# Patient Record
Sex: MALE | Race: WHITE | Employment: FULL TIME | URBAN - METROPOLITAN AREA
[De-identification: names, ages, dates, MRNs, and addresses within clinical notes are randomized per-mention and may not be internally consistent; named-entity substitution may affect disease eponyms.]

---

## 2017-02-21 DIAGNOSIS — E78.5 HYPERLIPIDEMIA LDL GOAL <100: ICD-10-CM

## 2017-02-23 ENCOUNTER — HOSPITAL ENCOUNTER (OUTPATIENT)
Dept: LAB | Age: 67
Discharge: HOME | End: 2017-02-23
Payer: COMMERCIAL

## 2017-02-23 LAB
ALBUMIN SERPL BCP-MCNC: 4 G/DL (ref 3.4–5)
ALBUMIN/GLOB SERPL: 1.2 {RATIO} (ref 0.8–1.7)
ALP SERPL-CCNC: 71 U/L (ref 45–117)
ALT SERPL-CCNC: 40 U/L (ref 16–61)
ANION GAP BLD CALC-SCNC: 7 MMOL/L (ref 3–18)
AST SERPL W P-5'-P-CCNC: 29 U/L (ref 15–37)
BILIRUB SERPL-MCNC: 0.5 MG/DL (ref 0.2–1)
BUN SERPL-MCNC: 14 MG/DL (ref 7–18)
BUN/CREAT SERPL: 18 (ref 12–20)
CALCIUM SERPL-MCNC: 9.3 MG/DL (ref 8.5–10.1)
CHLORIDE SERPL-SCNC: 103 MMOL/L (ref 100–108)
CHOLEST SERPL-MCNC: 154 MG/DL
CO2 SERPL-SCNC: 30 MMOL/L (ref 21–32)
CREAT SERPL-MCNC: 0.8 MG/DL (ref 0.6–1.3)
GLOBULIN SER CALC-MCNC: 3.3 G/DL (ref 2–4)
GLUCOSE SERPL-MCNC: 95 MG/DL (ref 74–99)
HDLC SERPL-MCNC: 47 MG/DL (ref 40–60)
HDLC SERPL: 3.3 {RATIO} (ref 0–5)
LDLC SERPL CALC-MCNC: 91.6 MG/DL (ref 0–100)
LIPID PROFILE,FLP: NORMAL
POTASSIUM SERPL-SCNC: 4.3 MMOL/L (ref 3.5–5.5)
PROT SERPL-MCNC: 7.3 G/DL (ref 6.4–8.2)
SODIUM SERPL-SCNC: 140 MMOL/L (ref 136–145)
TRIGL SERPL-MCNC: 77 MG/DL (ref ?–150)
VLDLC SERPL CALC-MCNC: 15.4 MG/DL

## 2017-02-23 PROCEDURE — 80053 COMPREHEN METABOLIC PANEL: CPT | Performed by: INTERNAL MEDICINE

## 2017-02-23 PROCEDURE — 36415 COLL VENOUS BLD VENIPUNCTURE: CPT | Performed by: INTERNAL MEDICINE

## 2017-02-23 PROCEDURE — 80061 LIPID PANEL: CPT | Performed by: INTERNAL MEDICINE

## 2017-02-28 ENCOUNTER — OFFICE VISIT (OUTPATIENT)
Dept: INTERNAL MEDICINE CLINIC | Age: 67
End: 2017-02-28

## 2017-02-28 VITALS
SYSTOLIC BLOOD PRESSURE: 132 MMHG | RESPIRATION RATE: 12 BRPM | DIASTOLIC BLOOD PRESSURE: 74 MMHG | BODY MASS INDEX: 26.26 KG/M2 | HEIGHT: 71 IN | HEART RATE: 81 BPM | OXYGEN SATURATION: 97 % | TEMPERATURE: 98.1 F | WEIGHT: 187.6 LBS

## 2017-02-28 DIAGNOSIS — K21.9 GASTROESOPHAGEAL REFLUX DISEASE WITHOUT ESOPHAGITIS: ICD-10-CM

## 2017-02-28 DIAGNOSIS — Z12.5 PROSTATE CANCER SCREENING: ICD-10-CM

## 2017-02-28 DIAGNOSIS — E78.5 HYPERLIPIDEMIA LDL GOAL <100: Primary | ICD-10-CM

## 2017-02-28 DIAGNOSIS — J30.1 SEASONAL ALLERGIC RHINITIS DUE TO POLLEN: ICD-10-CM

## 2017-02-28 DIAGNOSIS — Z00.00 ROUTINE GENERAL MEDICAL EXAMINATION AT A HEALTH CARE FACILITY: ICD-10-CM

## 2017-02-28 NOTE — PROGRESS NOTES
Lucretia Nunez,born 1950, is a 79 y.o. male, who is seen today for reevaluation of hyperlipidemia GERD allergic rhinitis. He feels well in general and has been out playing basketball with his grandchildren that we did sustain a bruise under his right eye from that. He does note occasionally some slight wheezing and has made an appointment to see a specialist about that. He is concerned because he used to be a heavy smoker though he quit many years ago. He takes his medicine regularly. Past Medical History:   Diagnosis Date    Allergic rhinitis     GERD (gastroesophageal reflux disease)     Hypercholesterolemia     Hyperlipidemia     Sarcoidosis (HCC)      Current Outpatient Prescriptions   Medication Sig Dispense Refill    HYDROcodone-acetaminophen (NORCO) 5-325 mg per tablet Take 1 Tab by mouth every four (4) hours as needed for Pain. 60 Tab 0    pantoprazole (PROTONIX) 40 mg tablet Take 1 Tab by mouth daily. 90 Tab 3    atorvastatin (LIPITOR) 40 mg tablet Take one tablet by mouth each evening 90 Tab 3    fluticasone (FLONASE) 50 mcg/actuation nasal spray 2 Sprays by Both Nostrils route daily. 3 Bottle 3    FOLIC ACID/MULTIVIT-MIN/LUTEIN (CENTRUM SILVER PO) Take  by mouth.  PHENYLEPHRINE HCL/ACETAMINOPHN (VICKS DAYQUIL SINEX PO) Take  by mouth.  ASPIRIN PO Take 81 mg by mouth daily. Visit Vitals    /74    Pulse 81    Temp 98.1 °F (36.7 °C) (Oral)    Resp 12    Ht 5' 11\" (1.803 m)    Wt 187 lb 9.6 oz (85.1 kg)    SpO2 97%    BMI 26.16 kg/m2     Carotids are 2+ without bruits. Lungs are clear to percussion. Good breath sounds with no wheezing or crackles. Heart reveals a regular rhythm with normal S1 and S2 no murmur gallop click or rub. Apical impulse is not palpable. Abdomen is soft and nontender with no hepatosplenomegaly or masses and no bruits. Extremities reveal no clubbing cyanosis or edema. Pulses are 2+.     Results for orders placed or performed during the hospital encounter of 02/23/17   LIPID PANEL   Result Value Ref Range    LIPID PROFILE          Cholesterol, total 154 <200 MG/DL    Triglyceride 77 <150 MG/DL    HDL Cholesterol 47 40 - 60 MG/DL    LDL, calculated 91.6 0 - 100 MG/DL    VLDL, calculated 15.4 MG/DL    CHOL/HDL Ratio 3.3 0 - 5.0     METABOLIC PANEL, COMPREHENSIVE   Result Value Ref Range    Sodium 140 136 - 145 mmol/L    Potassium 4.3 3.5 - 5.5 mmol/L    Chloride 103 100 - 108 mmol/L    CO2 30 21 - 32 mmol/L    Anion gap 7 3.0 - 18 mmol/L    Glucose 95 74 - 99 mg/dL    BUN 14 7.0 - 18 MG/DL    Creatinine 0.80 0.6 - 1.3 MG/DL    BUN/Creatinine ratio 18 12 - 20      GFR est AA >60 >60 ml/min/1.73m2    GFR est non-AA >60 >60 ml/min/1.73m2    Calcium 9.3 8.5 - 10.1 MG/DL    Bilirubin, total 0.5 0.2 - 1.0 MG/DL    ALT (SGPT) 40 16 - 61 U/L    AST (SGOT) 29 15 - 37 U/L    Alk. phosphatase 71 45 - 117 U/L    Protein, total 7.3 6.4 - 8.2 g/dL    Albumin 4.0 3.4 - 5.0 g/dL    Globulin 3.3 2.0 - 4.0 g/dL    A-G Ratio 1.2 0.8 - 1.7       Assessment: #1. Hyperlipidemia not quite as well controlled. He will continue atorvastatin 40 mg each evening and low-fat diet. #2.  GERD asymptomatic. He will continue pantoprazole 40 mg daily. #3.  Allergic rhinitis overall doing well, he will continue Flonase. #4.  Some recent wheezing, I recommended that he try coughing hard when this occurs and see if the wheezing stops in which case it likely is due to secretions and if it does not, could be underlying bronchospasm or much less likely any type of lesion in the airway. He will be seen his specialist in the near future. Follow-up 6 months for physical    Adelita Aguillon. Rosina Lyons MD FACP    Please note: This document has been produced using voice recognition software. Unrecognized errors in transcription may be present.

## 2017-02-28 NOTE — MR AVS SNAPSHOT
Visit Information Date & Time Provider Department Dept. Phone Encounter #  
 2/28/2017  8:30 AM Sandria Curling, MD Internist of [facility] [id number] Follow-up Instructions Follow-up and Disposition History Your Appointments 8/30/2017  8:25 AM  
LAB with [facility] NURSE VISIT Internist of [facility] Appt Note: pe lab fasting [address], [address] [place]  [address] [place]  
  
   
 [address], [address]  
  
    
 9/6/2017  9:15 AM  
PHYSICAL with Sandria Curling, MD  
Internist of [facility]) Appt Note: physical  
 [address] [facility], [address] [place]  [address] [place]  
  
   
 [address], [address] Upcoming Health Maintenance Date Due  
 MEDICARE YEARLY EXAM 2/28/2015 GLAUCOMA SCREENING Q2Y 8/20/2017 COLONOSCOPY 6/11/2022 DTaP/Tdap/Td series (2 - Td) 8/20/2025 Allergies as of 2/28/2017  Review Complete On: 2/28/2017 By: Sandria Curling, MD  
  
 Severity Noted Reaction Type Reactions Augmentin [Amoxicillin-pot Clavulanate]    Rash Medrol [Methylprednisolone]    Unknown (comments) nervous Sulfa (Sulfonamide Antibiotics)  01/19/2009    Unknown (comments) Current Immunizations  Reviewed on 8/29/2016 Name Date Influenza High Dose Vaccine PF 8/29/2016  9:48 AM  
 Influenza Vaccine (Quad) PF 2/19/2016  9:27 AM  
 Influenza Vaccine PF 10/29/2013 Influenza Vaccine Whole 11/1/2006 Pneumococcal Conjugate (PCV-13) 8/29/2016  9:46 AM  
 Pneumococcal Polysaccharide (PPSV-23) 2/19/2016  9:28 AM  
 TD Vaccine 1/25/1999 Tdap 8/20/2015 Zoster Vaccine, Live 8/20/2015 Not reviewed this visit You Were Diagnosed With   
  
 Codes Comments Hyperlipidemia LDL goal <100    -  Primary ICD-10-CM: E78.5 ICD-9-CM: 272.4 Seasonal allergic rhinitis due to pollen     ICD-10-CM: J30.1 ICD-9-CM: 477.0 Gastroesophageal reflux disease without esophagitis     ICD-10-CM: K21.9 ICD-9-CM: 530.81 Prostate cancer screening     ICD-10-CM: Z12.5 ICD-9-CM: V76.44 Routine general medical examination at a health care facility     ICD-10-CM: Z00.00 ICD-9-CM: V70.0 Vitals BP  
  
  
  
  
  
 132/74 Vitals History BMI and BSA Data Body Mass Index Body Surface Area  
 26.16 kg/m 2 2.06 m 2 Preferred Pharmacy Pharmacy Name Phone [address] [phone] Your Updated Medication List  
  
   
This list is accurate as of: 2/28/17  8:43 AM.  Always use your most recent med list.  
  
  
  
  
 ASPIRIN PO Take 81 mg by mouth daily. atorvastatin 40 mg tablet Commonly known as:  LIPITOR Take one tablet by mouth each evening CENTRUM SILVER PO Take  by mouth. fluticasone 50 mcg/actuation nasal spray Commonly known as:  Montine Franklin 2 Sprays by Both Nostrils route daily. HYDROcodone-acetaminophen 5-325 mg per tablet Commonly known as:  Candie Barrs Take 1 Tab by mouth every four (4) hours as needed for Pain.  
  
 pantoprazole 40 mg tablet Commonly known as:  PROTONIX Take 1 Tab by mouth daily. VICKS DAYQUIL SINEX PO Take  by mouth. To-Do List   
 Around 08/21/2017 Lab:  CBC WITH AUTOMATED DIFF Around 08/21/2017 Lab:  LIPID PANEL Around 08/21/2017 Lab:  METABOLIC PANEL, COMPREHENSIVE Around 08/21/2017 Lab:  PSA SCREENING (SCREENING) Introducing [address]! Dear Marcus Tidwell: Thank you for requesting a [employer] account. Our records indicate that you already have an active [employer] account. You can access your account anytime at [email or web address] [email or web address] Did you know that you can access your hospital and ER discharge instructions at any time in [employer]? You can also review all of your test results from your hospital stay or ER visit. Additional Information If you have questions, please visit the Frequently Asked Questions section of the Medcurrent website at [email or web address]. [email or web address]/. Remember, Medcurrent is NOT to be used for urgent needs. For medical emergencies, dial 911. Now available from your iPhone and Android! Please provide this summary of care documentation to your next provider. Your primary care clinician is listed as Candido Head. If you have any questions after today's visit, please call [phone].

## 2017-08-21 DIAGNOSIS — Z00.00 ROUTINE GENERAL MEDICAL EXAMINATION AT A HEALTH CARE FACILITY: ICD-10-CM

## 2017-08-21 DIAGNOSIS — Z12.5 PROSTATE CANCER SCREENING: ICD-10-CM

## 2017-08-21 DIAGNOSIS — E78.5 HYPERLIPIDEMIA LDL GOAL <100: ICD-10-CM

## 2017-08-30 ENCOUNTER — HOSPITAL ENCOUNTER (OUTPATIENT)
Dept: LAB | Age: 67
Discharge: HOME | End: 2017-08-30
Payer: COMMERCIAL

## 2017-08-30 LAB
ALBUMIN SERPL-MCNC: 3.9 G/DL (ref 3.4–5)
ALBUMIN/GLOB SERPL: 1.1 {RATIO} (ref 0.8–1.7)
ALP SERPL-CCNC: 76 U/L (ref 45–117)
ALT SERPL-CCNC: 39 U/L (ref 16–61)
ANION GAP SERPL CALC-SCNC: 7 MMOL/L (ref 3–18)
AST SERPL-CCNC: 25 U/L (ref 15–37)
BASOPHILS # BLD: 0 K/UL (ref 0–0.06)
BASOPHILS NFR BLD: 0 % (ref 0–2)
BILIRUB SERPL-MCNC: 0.6 MG/DL (ref 0.2–1)
BUN SERPL-MCNC: 12 MG/DL (ref 7–18)
BUN/CREAT SERPL: 15 (ref 12–20)
CALCIUM SERPL-MCNC: 9.5 MG/DL (ref 8.5–10.1)
CHLORIDE SERPL-SCNC: 104 MMOL/L (ref 100–108)
CHOLEST SERPL-MCNC: 157 MG/DL
CO2 SERPL-SCNC: 28 MMOL/L (ref 21–32)
CREAT SERPL-MCNC: 0.81 MG/DL (ref 0.6–1.3)
DIFFERENTIAL METHOD BLD: ABNORMAL
EOSINOPHIL # BLD: 0.1 K/UL (ref 0–0.4)
EOSINOPHIL NFR BLD: 3 % (ref 0–5)
ERYTHROCYTE [DISTWIDTH] IN BLOOD BY AUTOMATED COUNT: 14.7 % (ref 11.6–14.5)
GLOBULIN SER CALC-MCNC: 3.4 G/DL (ref 2–4)
GLUCOSE SERPL-MCNC: 97 MG/DL (ref 74–99)
HCT VFR BLD AUTO: 41.9 % (ref 36–48)
HDLC SERPL-MCNC: 39 MG/DL (ref 40–60)
HDLC SERPL: 4 {RATIO} (ref 0–5)
HGB BLD-MCNC: 13.9 G/DL (ref 13–16)
LDLC SERPL CALC-MCNC: 81.2 MG/DL (ref 0–100)
LIPID PROFILE,FLP: ABNORMAL
LYMPHOCYTES # BLD: 1.5 K/UL (ref 0.9–3.6)
LYMPHOCYTES NFR BLD: 34 % (ref 21–52)
MCH RBC QN AUTO: 28 PG (ref 24–34)
MCHC RBC AUTO-ENTMCNC: 33.2 G/DL (ref 31–37)
MCV RBC AUTO: 84.3 FL (ref 74–97)
MONOCYTES # BLD: 0.3 K/UL (ref 0.05–1.2)
MONOCYTES NFR BLD: 8 % (ref 3–10)
NEUTS SEG # BLD: 2.3 K/UL (ref 1.8–8)
NEUTS SEG NFR BLD: 55 % (ref 40–73)
PLATELET # BLD AUTO: 219 K/UL (ref 135–420)
PMV BLD AUTO: 10.1 FL (ref 9.2–11.8)
POTASSIUM SERPL-SCNC: 4.5 MMOL/L (ref 3.5–5.5)
PROT SERPL-MCNC: 7.3 G/DL (ref 6.4–8.2)
PSA SERPL-MCNC: 0.3 NG/ML (ref 0–4)
RBC # BLD AUTO: 4.97 M/UL (ref 4.7–5.5)
SODIUM SERPL-SCNC: 139 MMOL/L (ref 136–145)
TRIGL SERPL-MCNC: 184 MG/DL (ref ?–150)
VLDLC SERPL CALC-MCNC: 36.8 MG/DL
WBC # BLD AUTO: 4.2 K/UL (ref 4.6–13.2)

## 2017-08-30 PROCEDURE — 80061 LIPID PANEL: CPT | Performed by: INTERNAL MEDICINE

## 2017-08-30 PROCEDURE — 84153 ASSAY OF PSA TOTAL: CPT | Performed by: INTERNAL MEDICINE

## 2017-08-30 PROCEDURE — 80053 COMPREHEN METABOLIC PANEL: CPT | Performed by: INTERNAL MEDICINE

## 2017-08-30 PROCEDURE — 85025 COMPLETE CBC W/AUTO DIFF WBC: CPT | Performed by: INTERNAL MEDICINE

## 2017-08-30 PROCEDURE — 36415 COLL VENOUS BLD VENIPUNCTURE: CPT | Performed by: INTERNAL MEDICINE

## 2017-09-06 ENCOUNTER — OFFICE VISIT (OUTPATIENT)
Dept: INTERNAL MEDICINE CLINIC | Age: 67
End: 2017-09-06

## 2017-09-06 VITALS
OXYGEN SATURATION: 98 % | RESPIRATION RATE: 12 BRPM | TEMPERATURE: 97.6 F | HEART RATE: 72 BPM | WEIGHT: 187.6 LBS | HEIGHT: 71 IN | DIASTOLIC BLOOD PRESSURE: 84 MMHG | BODY MASS INDEX: 26.26 KG/M2 | SYSTOLIC BLOOD PRESSURE: 142 MMHG

## 2017-09-06 DIAGNOSIS — K21.9 GASTROESOPHAGEAL REFLUX DISEASE WITHOUT ESOPHAGITIS: ICD-10-CM

## 2017-09-06 DIAGNOSIS — Z23 ENCOUNTER FOR IMMUNIZATION: ICD-10-CM

## 2017-09-06 DIAGNOSIS — Z00.00 ROUTINE GENERAL MEDICAL EXAMINATION AT A HEALTH CARE FACILITY: Primary | ICD-10-CM

## 2017-09-06 DIAGNOSIS — E78.5 HYPERLIPIDEMIA LDL GOAL <100: ICD-10-CM

## 2017-09-06 RX ORDER — FLUTICASONE PROPIONATE 50 MCG
2 SPRAY, SUSPENSION (ML) NASAL DAILY
Qty: 3 BOTTLE | Refills: 3 | Status: SHIPPED | OUTPATIENT
Start: 2017-09-06 | End: 2018-09-11 | Stop reason: SDUPTHER

## 2017-09-06 RX ORDER — ATORVASTATIN CALCIUM 40 MG/1
TABLET, FILM COATED ORAL
Qty: 90 TAB | Refills: 3 | Status: SHIPPED | OUTPATIENT
Start: 2017-09-06 | End: 2018-09-11 | Stop reason: SDUPTHER

## 2017-09-06 RX ORDER — PANTOPRAZOLE SODIUM 40 MG/1
40 TABLET, DELAYED RELEASE ORAL DAILY
Qty: 90 TAB | Refills: 3 | Status: SHIPPED | OUTPATIENT
Start: 2017-09-06 | End: 2018-09-11 | Stop reason: SDUPTHER

## 2017-09-06 RX ORDER — HYDROCODONE BITARTRATE AND ACETAMINOPHEN 5; 325 MG/1; MG/1
1 TABLET ORAL
Qty: 60 TAB | Refills: 0 | Status: SHIPPED | OUTPATIENT
Start: 2017-09-06 | End: 2018-03-08 | Stop reason: SDUPTHER

## 2017-09-06 NOTE — MR AVS SNAPSHOT
Visit Information Date & Time Provider Department Dept. Phone Encounter #  
 9/6/2017  9:15 AM Scarlett Chacon MD Internist of [address] [id number] Your Appointments 3/1/2018  9:15 AM  
LAB with [facility] NURSE VISIT Internist of [facility] ([facility]) Appt Note: lab  
 [address], [address] [id number] [address] [place]  
  
   
 [address], [address]  
  
    
 3/8/2018 10:00 AM  
Office Visit with Scarlett Chacon MD  
Internist of [address] [place] [address] Appt Note: 6 month f/u  
 [address] [facility], [address] [address] [address] [place]  
  
   
 [address], [address] Upcoming Health Maintenance Date Due  
 GLAUCOMA SCREENING Q2Y 11/1/2017* MEDICARE YEARLY EXAM 9/7/2018 COLONOSCOPY 6/11/2022 DTaP/Tdap/Td series (2 - Td) 8/20/2025 *Topic was postponed. The date shown is not the original due date. Allergies as of 9/6/2017  Review Complete On: 9/6/2017 By: Scarlett Chacon MD  
  
 Severity Noted Reaction Type Reactions Augmentin [Amoxicillin-pot Clavulanate]    Rash Medrol [Methylprednisolone]    Unknown (comments) nervous Sulfa (Sulfonamide Antibiotics)  01/19/2009    Unknown (comments) Current Immunizations  Reviewed on 9/6/2017 Name Date Influenza High Dose Vaccine PF 8/29/2016  9:48 AM  
 Influenza Vaccine (Quad) PF 2/19/2016  9:27 AM  
 Influenza Vaccine PF 10/29/2013 Influenza Vaccine Whole 11/1/2006 Pneumococcal Conjugate (PCV-13) 8/29/2016  9:46 AM  
 Pneumococcal Polysaccharide (PPSV-23) 2/19/2016  9:28 AM  
 TD Vaccine 1/25/1999 Tdap 8/20/2015 Zoster Vaccine, Live 8/20/2015 Reviewed by Scarlett Chacon MD on 9/6/2017 at  9:17 AM  
 Reviewed by Scarlett Chacon MD on 9/6/2017 at  9:33 AM  
You Were Diagnosed With   
  
 Codes Comments Routine general medical examination at a health care facility    -  Primary ICD-10-CM: Z00.00 ICD-9-CM: V70.0 Gastroesophageal reflux disease without esophagitis     ICD-10-CM: K21.9 ICD-9-CM: 530.81 Hyperlipidemia LDL goal <100     ICD-10-CM: E78.5 ICD-9-CM: 272.4 Vitals BP Pulse Temp Resp Height(growth percentile) Weight(growth percentile) 142/84 72 97.6 °F (36.4 °C) (Oral) 12 5' 11\" (1.803 m) 187 lb 9.6 oz (85.1 kg) SpO2 BMI Smoking Status 98% 26.16 kg/m2 Former Smoker Vitals History BMI and BSA Data Body Mass Index Body Surface Area  
 26.16 kg/m 2 2.06 m 2 Preferred Pharmacy Pharmacy Name Phone  [address] [phone] Your Updated Medication List  
  
   
This list is accurate as of: 9/6/17  9:34 AM.  Always use your most recent med list.  
  
  
  
  
 ASPIRIN PO Take 81 mg by mouth daily. atorvastatin 40 mg tablet Commonly known as:  LIPITOR Take one tablet by mouth each evening CENTRUM SILVER PO Take  by mouth. fluticasone 50 mcg/actuation nasal spray Commonly known as:  Rachel Boulder 2 Sprays by Both Nostrils route daily. HYDROcodone-acetaminophen 5-325 mg per tablet Commonly known as:  Fredrica Ervin Take 1 Tab by mouth every four (4) hours as needed for Pain.  
  
 pantoprazole 40 mg tablet Commonly known as:  PROTONIX Take 1 Tab by mouth daily. Prescriptions Printed Refills HYDROcodone-acetaminophen (NORCO) 5-325 mg per tablet 0 Sig: Take 1 Tab by mouth every four (4) hours as needed for Pain. Class: Print Route: Oral  
  
Prescriptions Sent to Pharmacy Refills  
 pantoprazole (PROTONIX) 40 mg tablet 3 Sig: Take 1 Tab by mouth daily. Class: Normal  
 Pharmacy: [facility] [address] Ph #: [phone]  Route: Oral  
 atorvastatin (LIPITOR) 40 mg tablet 3  
 Sig: Take one tablet by mouth each evening Class: Normal  
 Pharmacy:  [address] Ph #: [phone]  
 fluticasone (FLONASE) 50 mcg/actuation nasal spray 3 Si Sprays by Both Nostrils route daily. Class: Normal  
 Pharmacy: [facility] [address] Ph #: [phone] Route: Both Nostrils To-Do List   
 Around 2018 Lab:  LIPID PANEL Around 2018 Lab:  METABOLIC PANEL, COMPREHENSIVE [facility] SERVICES! Dear Conrad Del Valle: Thank you for requesting a [employer] account. Our records indicate that you already have an active [employer] account. You can access your account anytime at [email or web address]. [employer]/[email or web address] Did you know that you can access your hospital and ER discharge instructions at any time in [employer]? You can also review all of your test results from your hospital stay or ER visit. Additional Information If you have questions, please visit the Frequently Asked Questions section of the [employer] website at [email or web address] [email or web address] Remember, [employer] is NOT to be used for urgent needs. For medical emergencies, dial 911. Now available from your iPhone and Android! Please provide this summary of care documentation to your next provider. Your primary care clinician is listed as Lovely Enrique. If you have any questions after today's visit, please call [phone].

## 2017-09-06 NOTE — PROGRESS NOTES
Guillermo Nunez,born 1950, is a 79 y.o. male, who is seen today for routine physical exam.  He feels well but has chronic pain especially with weather changes in his right shoulder where he has had 3 surgeries. He occasionally uses was strength hydrocodone for that and he does need a refill. He has a history of GERD and is asymptomatic currently as he continues Protonix. He has hyperlipidemia and uses atorvastatin regularly. He has allergies and uses Flonase seasonally. He is overweight and has gained 6 pounds in the last year but no more in the last 6 months, he does try to eat a very healthy diet. Past Medical History:   Diagnosis Date    Allergic rhinitis     GERD (gastroesophageal reflux disease)     Hypercholesterolemia     Hyperlipidemia     Sarcoidosis ([facility] [address]      Past Surgical History:   Procedure Laterality Date    ENDOSCOPY, COLON, DIAGNOSTIC  6/12    normal, followup 10 years    HX COLONOSCOPY  06/11/2012    Normal, 10 years, Dr. Alban Hernandez HX GI      Hernia repair    HX HEENT      tonsilectomy    HX HERNIA REPAIR      HX ORTHOPAEDIC      rotator cuff repair    HX ORTHOPAEDIC      carpal tunnel     Current Outpatient Prescriptions   Medication Sig Dispense Refill    HYDROcodone-acetaminophen (NORCO) 5-325 mg per tablet Take 1 Tab by mouth every four (4) hours as needed for Pain. 60 Tab 0    pantoprazole (PROTONIX) 40 mg tablet Take 1 Tab by mouth daily. 90 Tab 3    atorvastatin (LIPITOR) 40 mg tablet Take one tablet by mouth each evening 90 Tab 3    fluticasone (FLONASE) 50 mcg/actuation nasal spray 2 Sprays by Both Nostrils route daily. 3 Bottle 3    FOLIC ACID/MULTIVIT-MIN/LUTEIN (CENTRUM SILVER PO) Take  by mouth.  ASPIRIN PO Take 81 mg by mouth daily.        Allergies   Allergen Reactions    Augmentin [Amoxicillin-Pot Clavulanate] Rash    Medrol [Methylprednisolone] Unknown (comments)     nervous    Sulfa (Sulfonamide Antibiotics) Unknown (comments)     Social History     Social History    Marital status:      Spouse name: N/A    Number of children: N/A    Years of education: N/A     Social History Main Topics    Smoking status: Former Smoker     Quit date: 1/1/1980    Smokeless tobacco: Never Used    Alcohol use No    Drug use: No    Sexual activity: Not Asked     Other Topics Concern    None     Social History Narrative     Visit Vitals    /84    Pulse 72    Temp 97.6 °F (36.4 °C) (Oral)    Resp 12    Ht 5' 11\" (1.803 m)    Wt 187 lb 9.6 oz (85.1 kg)    SpO2 98%    BMI 26.16 kg/m2     The patient is a well-developed, well-nourished male in no apparent distress. HEENT: Pupils are equal and react to light and extraocular movements are full. Ear canals and tympanic membranes appear normal. Oral cavity appears normal with no oral lesions. Neck: Carotids are 2+ without bruits. No adenopathy or thyromegaly. Lungs are clear to percussion. I hear no wheezing, rales or rhonchi. Heart reveals a nondisplaced apical impulse in the fifth interspace at the midclavicular line. Rhythm is regular and no murmur, gallop, click or rub. Abdomen is soft and nontender with no hepatosplenomegaly or masses. No distention or tympany and normoactive bowel sounds. Extremities reveal no clubbing cyanosis or edema. Pulses are 2+ throughout. Normal external genitalia with no hernia. Rectal exam is normal.  The prostate is symmetric and smooth with no nodules. No suspicious skin growths.   Results for orders placed or performed during the hospital encounter of 08/30/17   CBC WITH AUTOMATED DIFF   Result Value Ref Range    WBC 4.2 (L) 4.6 - 13.2 K/uL    RBC 4.97 4.70 - 5.50 M/uL    HGB 13.9 13.0 - 16.0 g/dL    HCT 41.9 36.0 - 48.0 %    MCV 84.3 74.0 - 97.0 FL    MCH 28.0 24.0 - 34.0 PG    MCHC 33.2 31.0 - 37.0 g/dL    RDW 14.7 (H) 11.6 - 14.5 %    PLATELET 179 373 - 096 K/uL    MPV 10.1 9.2 - 11.8 FL    NEUTROPHILS 55 40 - 73 %    LYMPHOCYTES 34 21 - 52 %    MONOCYTES 8 3 - 10 %    EOSINOPHILS 3 0 - 5 %    BASOPHILS 0 0 - 2 %    ABS. NEUTROPHILS 2.3 1.8 - 8.0 K/UL    ABS. LYMPHOCYTES 1.5 0.9 - 3.6 K/UL    ABS. MONOCYTES 0.3 0.05 - 1.2 K/UL    ABS. EOSINOPHILS 0.1 0.0 - 0.4 K/UL    ABS. BASOPHILS 0.0 0.0 - 0.06 K/UL    DF AUTOMATED     LIPID PANEL   Result Value Ref Range    LIPID PROFILE          Cholesterol, total 157 <200 MG/DL    Triglyceride 184 (H) <150 MG/DL    HDL Cholesterol 39 (L) 40 - 60 MG/DL    LDL, calculated 81.2 0 - 100 MG/DL    VLDL, calculated 36.8 MG/DL    CHOL/HDL Ratio 4.0 0 - 5.0     METABOLIC PANEL, COMPREHENSIVE   Result Value Ref Range    Sodium 139 136 - 145 mmol/L    Potassium 4.5 3.5 - 5.5 mmol/L    Chloride 104 100 - 108 mmol/L    CO2 28 21 - 32 mmol/L    Anion gap 7 3.0 - 18 mmol/L    Glucose 97 74 - 99 mg/dL    BUN 12 7.0 - 18 MG/DL    Creatinine 0.81 0.6 - 1.3 MG/DL    BUN/Creatinine ratio 15 12 - 20      GFR est AA >60 >60 ml/min/1.73m2    GFR est non-AA >60 >60 ml/min/1.73m2    Calcium 9.5 8.5 - 10.1 MG/DL    Bilirubin, total 0.6 0.2 - 1.0 MG/DL    ALT (SGPT) 39 16 - 61 U/L    AST (SGOT) 25 15 - 37 U/L    Alk. phosphatase 76 45 - 117 U/L    Protein, total 7.3 6.4 - 8.2 g/dL    Albumin 3.9 3.4 - 5.0 g/dL    Globulin 3.4 2.0 - 4.0 g/dL    A-G Ratio 1.1 0.8 - 1.7     PSA SCREENING (SCREENING)   Result Value Ref Range    Prostate Specific Ag 0.3 0.0 - 4.0 ng/mL     Assessment: #1. Hyperlipidemia is doing well. He will continue atorvastatin 40 mg each evening. #2.  GERD asymptomatic. He will continue pantoprazole 40 mg daily. #3.  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 #4.  Seasonal allergic rhinitis, he will use Flonase as needed seasonally. #5.  Overweight with a 6 pound weight gain in the last year. He will continue very healthy diet and work on losing a few pounds over the next 6 months.   He has had upper normal glucose and told him the chances of glucose becoming too high and developing diabetes is much lower with weight control. He will get a flu shot now and follow-up will be in 6 months with lipids and CMP. Giovanny Cooney MD FACP    Please note: This document has been produced using voice recognition software. Unrecognized errors in transcription may be present.

## 2017-09-27 ENCOUNTER — TELEPHONE (OUTPATIENT)
Dept: INTERNAL MEDICINE CLINIC | Age: 67
End: 2017-09-27

## 2018-02-27 DIAGNOSIS — E78.5 HYPERLIPIDEMIA LDL GOAL <100: ICD-10-CM

## 2018-03-01 ENCOUNTER — HOSPITAL ENCOUNTER (OUTPATIENT)
Dept: LAB | Age: 68
Discharge: HOME | End: 2018-03-01
Payer: COMMERCIAL

## 2018-03-01 LAB
ALBUMIN SERPL-MCNC: 4 G/DL (ref 3.4–5)
ALBUMIN/GLOB SERPL: 1.2 {RATIO} (ref 0.8–1.7)
ALP SERPL-CCNC: 68 U/L (ref 45–117)
ALT SERPL-CCNC: 38 U/L (ref 16–61)
ANION GAP SERPL CALC-SCNC: 8 MMOL/L (ref 3–18)
AST SERPL-CCNC: 23 U/L (ref 15–37)
BILIRUB SERPL-MCNC: 0.4 MG/DL (ref 0.2–1)
BUN SERPL-MCNC: 15 MG/DL (ref 7–18)
BUN/CREAT SERPL: 21 (ref 12–20)
CALCIUM SERPL-MCNC: 9.4 MG/DL (ref 8.5–10.1)
CHLORIDE SERPL-SCNC: 105 MMOL/L (ref 100–108)
CHOLEST SERPL-MCNC: 155 MG/DL
CO2 SERPL-SCNC: 28 MMOL/L (ref 21–32)
CREAT SERPL-MCNC: 0.72 MG/DL (ref 0.6–1.3)
GLOBULIN SER CALC-MCNC: 3.3 G/DL (ref 2–4)
GLUCOSE SERPL-MCNC: 107 MG/DL (ref 74–99)
HDLC SERPL-MCNC: 41 MG/DL (ref 40–60)
HDLC SERPL: 3.8 {RATIO} (ref 0–5)
LDLC SERPL CALC-MCNC: 88.4 MG/DL (ref 0–100)
LIPID PROFILE,FLP: NORMAL
POTASSIUM SERPL-SCNC: 4.3 MMOL/L (ref 3.5–5.5)
PROT SERPL-MCNC: 7.3 G/DL (ref 6.4–8.2)
SODIUM SERPL-SCNC: 141 MMOL/L (ref 136–145)
TRIGL SERPL-MCNC: 128 MG/DL (ref ?–150)
VLDLC SERPL CALC-MCNC: 25.6 MG/DL

## 2018-03-01 PROCEDURE — 36415 COLL VENOUS BLD VENIPUNCTURE: CPT | Performed by: INTERNAL MEDICINE

## 2018-03-01 PROCEDURE — 80061 LIPID PANEL: CPT | Performed by: INTERNAL MEDICINE

## 2018-03-01 PROCEDURE — 80053 COMPREHEN METABOLIC PANEL: CPT | Performed by: INTERNAL MEDICINE

## 2018-03-08 ENCOUNTER — OFFICE VISIT (OUTPATIENT)
Dept: INTERNAL MEDICINE CLINIC | Age: 68
End: 2018-03-08

## 2018-03-08 VITALS
TEMPERATURE: 98.3 F | OXYGEN SATURATION: 98 % | BODY MASS INDEX: 26.32 KG/M2 | WEIGHT: 188 LBS | SYSTOLIC BLOOD PRESSURE: 138 MMHG | DIASTOLIC BLOOD PRESSURE: 62 MMHG | HEIGHT: 71 IN | RESPIRATION RATE: 12 BRPM | HEART RATE: 85 BPM

## 2018-03-08 DIAGNOSIS — J30.1 CHRONIC ALLERGIC RHINITIS DUE TO POLLEN, UNSPECIFIED SEASONALITY: ICD-10-CM

## 2018-03-08 DIAGNOSIS — M15.9 PRIMARY OSTEOARTHRITIS INVOLVING MULTIPLE JOINTS: ICD-10-CM

## 2018-03-08 DIAGNOSIS — E78.5 HYPERLIPIDEMIA LDL GOAL <100: Primary | ICD-10-CM

## 2018-03-08 DIAGNOSIS — K21.9 GASTROESOPHAGEAL REFLUX DISEASE WITHOUT ESOPHAGITIS: ICD-10-CM

## 2018-03-08 DIAGNOSIS — Z12.5 PROSTATE CANCER SCREENING: ICD-10-CM

## 2018-03-08 DIAGNOSIS — Z00.00 ROUTINE GENERAL MEDICAL EXAMINATION AT A HEALTH CARE FACILITY: ICD-10-CM

## 2018-03-08 RX ORDER — HYDROCODONE BITARTRATE AND ACETAMINOPHEN 5; 325 MG/1; MG/1
1 TABLET ORAL
Qty: 60 TAB | Refills: 0 | Status: SHIPPED | OUTPATIENT
Start: 2018-03-08 | End: 2018-09-11 | Stop reason: SDUPTHER

## 2018-03-08 NOTE — MR AVS SNAPSHOT
[address] [facility], [place], operated by [facility] 
 
 
 [address] [facility], [address] [facility] 
[phone] Patient: Neeraj Holliday MRN: [id number] IIO:4/40/[birth year]307 Visit Information Date & Time Provider Department Dept. Phone Encounter #  
 3/8/2018 10:00 AM Corry Gonzalez MD [facility] [address] [place] [address] [phone] [id number] Your Appointments 9/11/2018  9:15 AM  
PHYSICAL with Corry Gonzalez MD  
[facility] [address] [place] [address] [address] [facility]) Appt Note: rpe  
 [address] [facility], [facility] [address] [address] [facility]  
  
   
 [address], [address] Upcoming Health Maintenance Date Due Bone Densitometry (Dexa) Screening 4/2/2018* GLAUCOMA SCREENING Q2Y 11/1/2019 COLONOSCOPY 6/11/2022 DTaP/Tdap/Td series (2 - Td) 8/20/2025 *Topic was postponed. The date shown is not the original due date. Allergies as of 3/8/2018  Review Complete On: 3/8/2018 By: Corry Gonzalez MD  
  
 Severity Noted Reaction Type Reactions Augmentin [Amoxicillin-pot Clavulanate]    Rash Medrol [Methylprednisolone]    Unknown (comments) nervous Sulfa (Sulfonamide Antibiotics)  01/19/2009    Unknown (comments) Current Immunizations  Reviewed on 9/6/2017 Name Date Influenza High Dose Vaccine PF 9/6/2017  9:39 AM, 8/29/2016  9:48 AM  
 Influenza Vaccine (Quad) PF 2/19/2016  9:27 AM  
 Influenza Vaccine PF 10/29/2013 Influenza Vaccine Whole 11/1/2006 Pneumococcal Conjugate (PCV-13) 8/29/2016  9:46 AM  
 Pneumococcal Polysaccharide (PPSV-23) 2/19/2016  9:28 AM  
 TD Vaccine 1/25/1999 Tdap 8/20/2015 Zoster Vaccine, Live 8/20/2015 Not reviewed this visit You Were Diagnosed With   
  
 Codes Comments Hyperlipidemia LDL goal <100    -  Primary ICD-10-CM: E78.5 ICD-9-CM: 272.4 Primary osteoarthritis involving multiple joints     ICD-10-CM: M15.0 ICD-9-CM: 715.09   
 Gastroesophageal reflux disease without esophagitis     ICD-10-CM: K21.9 ICD-9-CM: 530.81 Chronic allergic rhinitis due to pollen, unspecified seasonality     ICD-10-CM: J30.1 ICD-9-CM: 477.0 Prostate cancer screening     ICD-10-CM: Z12.5 ICD-9-CM: V76.44 Routine general medical examination at a health care facility     ICD-10-CM: Z00.00 ICD-9-CM: V70.0 Vitals BP Pulse Temp Resp Height(growth percentile) Weight(growth percentile)  
 138/62 85 98.3 °F (36.8 °C) (Oral) 12 5' 11\" (1.803 m) 188 lb (85.3 kg) SpO2 BMI Smoking Status 98% 26.22 kg/m2 Former Smoker Vitals History BMI and BSA Data Body Mass Index Body Surface Area  
 26.22 kg/m 2 2.07 m 2 Preferred Pharmacy Pharmacy Name Phone  [facility] [phone] Your Updated Medication List  
  
   
This list is accurate as of 3/8/18 10:18 AM.  Always use your most recent med list.  
  
  
  
  
 ASPIRIN PO Take 81 mg by mouth daily. atorvastatin 40 mg tablet Commonly known as:  LIPITOR Take one tablet by mouth each evening CENTRUM SILVER PO Take  by mouth. fluticasone 50 mcg/actuation nasal spray Commonly known as:  Leartis  2 Sprays by Both Nostrils route daily. HYDROcodone-acetaminophen 5-325 mg per tablet Commonly known as:  Haily Punt Take 1 Tab by mouth every four (4) hours as needed for Pain.  
  
 pantoprazole 40 mg tablet Commonly known as:  PROTONIX Take 1 Tab by mouth daily. Prescriptions Printed Refills HYDROcodone-acetaminophen (NORCO) 5-325 mg per tablet 0 Sig: Take 1 Tab by mouth every four (4) hours as needed for Pain. Class: Print Route: Oral  
  
To-Do List   
 Around 2018 Lab:  CBC WITH AUTOMATED DIFF Around 2018 Lab:  LIPID PANEL Around 2018 Lab:  METABOLIC PANEL, COMPREHENSIVE Around 2018 Lab: PSA SCREENING (SCREENING) Introducing [facility]! Dear Collette Sheriff: Thank you for requesting a [employer] account. Our records indicate that you already have an active [employer] account. You can access your account anytime at [email or web address] Did you know that you can access your hospital and ER discharge instructions at any time in [employer]? You can also review all of your test results from your hospital stay or ER visit. Additional Information If you have questions, please visit the Frequently Asked Questions section of the [employer] website at [email or web address] Remember, [employer] is NOT to be used for urgent needs. For medical emergencies, dial 911. Now available from your iPhone and Android! Please provide this summary of care documentation to your next provider. Your primary care clinician is listed as Emil Tavarez. Zandra Pena. If you have any questions after today's visit, please call [phone].

## 2018-03-08 NOTE — PROGRESS NOTES
David Nunez,born 1950, is a 76 y.o. male, who is seen today for reevaluation of hyperlipidemia DJD GERD allergic rhinitis. He feels well but he does have arthritis and occasionally uses Norco.  Nonsteroidals have not been as effective. He takes his medicine regularly and eats a healthy diet. No chest pain or dyspnea. Past Medical History:   Diagnosis Date    Allergic rhinitis     GERD (gastroesophageal reflux disease)     Hypercholesterolemia     Hyperlipidemia     Sarcoidosis      Current Outpatient Prescriptions   Medication Sig Dispense Refill    HYDROcodone-acetaminophen (NORCO) 5-325 mg per tablet Take 1 Tab by mouth every four (4) hours as needed for Pain. 60 Tab 0    pantoprazole (PROTONIX) 40 mg tablet Take 1 Tab by mouth daily. 90 Tab 3    atorvastatin (LIPITOR) 40 mg tablet Take one tablet by mouth each evening 90 Tab 3    fluticasone (FLONASE) 50 mcg/actuation nasal spray 2 Sprays by Both Nostrils route daily. 3 Bottle 3    FOLIC ACID/MULTIVIT-MIN/LUTEIN (CENTRUM SILVER PO) Take  by mouth.  ASPIRIN PO Take 81 mg by mouth daily. Visit Vitals    /62    Pulse 85    Temp 98.3 °F (36.8 °C) (Oral)    Resp 12    Ht 5' 11\" (1.803 m)    Wt 188 lb (85.3 kg)    SpO2 98%    BMI 26.22 kg/m2     Carotids are 2+ without bruits. Lungs are clear to percussion. Good breath sounds with no wheezing or crackles. Heart reveals a regular rhythm with normal S1 and S2 no murmur gallop click or rub. Apical impulse is in the fifth interspace at the midclavicular line. Abdomen is soft and nontender with no hepatosplenomegaly or masses and no bruits. Extremities reveal no clubbing cyanosis or edema. No active joints. Pulses are 2+.     Results for orders placed or performed during the hospital encounter of 03/01/18   LIPID PANEL   Result Value Ref Range    LIPID PROFILE          Cholesterol, total 155 <200 MG/DL    Triglyceride 128 <150 MG/DL    HDL Cholesterol 41 40 - 60 MG/DL    LDL, calculated 88.4 0 - 100 MG/DL    VLDL, calculated 25.6 MG/DL    CHOL/HDL Ratio 3.8 0 - 5.0     METABOLIC PANEL, COMPREHENSIVE   Result Value Ref Range    Sodium 141 136 - 145 mmol/L    Potassium 4.3 3.5 - 5.5 mmol/L    Chloride 105 100 - 108 mmol/L    CO2 28 21 - 32 mmol/L    Anion gap 8 3.0 - 18 mmol/L    Glucose 107 (H) 74 - 99 mg/dL    BUN 15 7.0 - 18 MG/DL    Creatinine 0.72 0.6 - 1.3 MG/DL    BUN/Creatinine ratio 21 (H) 12 - 20      GFR est AA >60 >60 ml/min/1.73m2    GFR est non-AA >60 >60 ml/min/1.73m2    Calcium 9.4 8.5 - 10.1 MG/DL    Bilirubin, total 0.4 0.2 - 1.0 MG/DL    ALT (SGPT) 38 16 - 61 U/L    AST (SGOT) 23 15 - 37 U/L    Alk. phosphatase 68 45 - 117 U/L    Protein, total 7.3 6.4 - 8.2 g/dL    Albumin 4.0 3.4 - 5.0 g/dL    Globulin 3.3 2.0 - 4.0 g/dL    A-G Ratio 1.2 0.8 - 1.7       Assessment: #1. Hyperlipidemia doing well. He will continue atorvastatin 40 mg each evening. #2.  GERD asymptomatic. He will continue pantoprazole 40 mg daily. #3.  Allergic rhinitis doing well, he will continue Flonase. #4.  Impaired fasting glucose with glucose higher than it has been in the past.  He will be avoiding sweets and try to lose a few pounds over the next 6 months. #5.  DJD, he will use [place] when needed, he goes through several of these tablets per month. Follow-up 6 months for physical    Arleth Cui. Gloria Casillas MD FACP    Please note: This document has been produced using voice recognition software. Unrecognized errors in transcription may be present.

## 2018-09-04 ENCOUNTER — APPOINTMENT (OUTPATIENT)
Dept: INTERNAL MEDICINE CLINIC | Age: 68
End: 2018-09-04

## 2018-09-04 ENCOUNTER — HOSPITAL ENCOUNTER (OUTPATIENT)
Dept: LAB | Age: 68
Discharge: HOME | End: 2018-09-04
Payer: COMMERCIAL

## 2018-09-04 DIAGNOSIS — Z00.00 ROUTINE GENERAL MEDICAL EXAMINATION AT A HEALTH CARE FACILITY: ICD-10-CM

## 2018-09-04 DIAGNOSIS — Z12.5 PROSTATE CANCER SCREENING: ICD-10-CM

## 2018-09-04 LAB
ALBUMIN SERPL-MCNC: 3.7 G/DL (ref 3.4–5)
ALBUMIN/GLOB SERPL: 1.1 {RATIO} (ref 0.8–1.7)
ALP SERPL-CCNC: 73 U/L (ref 45–117)
ALT SERPL-CCNC: 39 U/L (ref 16–61)
ANION GAP SERPL CALC-SCNC: 7 MMOL/L (ref 3–18)
AST SERPL-CCNC: 28 U/L (ref 15–37)
BASOPHILS # BLD: 0 K/UL (ref 0–0.1)
BASOPHILS NFR BLD: 0 % (ref 0–2)
BILIRUB SERPL-MCNC: 0.4 MG/DL (ref 0.2–1)
BUN SERPL-MCNC: 19 MG/DL (ref 7–18)
BUN/CREAT SERPL: 27 (ref 12–20)
CALCIUM SERPL-MCNC: 9.4 MG/DL (ref 8.5–10.1)
CHLORIDE SERPL-SCNC: 108 MMOL/L (ref 100–108)
CHOLEST SERPL-MCNC: 145 MG/DL
CO2 SERPL-SCNC: 27 MMOL/L (ref 21–32)
CREAT SERPL-MCNC: 0.71 MG/DL (ref 0.6–1.3)
DIFFERENTIAL METHOD BLD: ABNORMAL
EOSINOPHIL # BLD: 0.1 K/UL (ref 0–0.4)
EOSINOPHIL NFR BLD: 3 % (ref 0–5)
ERYTHROCYTE [DISTWIDTH] IN BLOOD BY AUTOMATED COUNT: 14.8 % (ref 11.6–14.5)
GLOBULIN SER CALC-MCNC: 3.5 G/DL (ref 2–4)
GLUCOSE SERPL-MCNC: 114 MG/DL (ref 74–99)
HCT VFR BLD AUTO: 41.4 % (ref 36–48)
HDLC SERPL-MCNC: 43 MG/DL (ref 40–60)
HDLC SERPL: 3.4 {RATIO} (ref 0–5)
HGB BLD-MCNC: 13.8 G/DL (ref 13–16)
LDLC SERPL CALC-MCNC: 84 MG/DL (ref 0–100)
LIPID PROFILE,FLP: NORMAL
LYMPHOCYTES # BLD: 1.2 K/UL (ref 0.9–3.6)
LYMPHOCYTES NFR BLD: 33 % (ref 21–52)
MCH RBC QN AUTO: 28.3 PG (ref 24–34)
MCHC RBC AUTO-ENTMCNC: 33.3 G/DL (ref 31–37)
MCV RBC AUTO: 84.8 FL (ref 74–97)
MONOCYTES # BLD: 0.3 K/UL (ref 0.05–1.2)
MONOCYTES NFR BLD: 9 % (ref 3–10)
NEUTS SEG # BLD: 2 K/UL (ref 1.8–8)
NEUTS SEG NFR BLD: 55 % (ref 40–73)
PLATELET # BLD AUTO: 193 K/UL (ref 135–420)
PMV BLD AUTO: 9.9 FL (ref 9.2–11.8)
POTASSIUM SERPL-SCNC: 4.9 MMOL/L (ref 3.5–5.5)
PROT SERPL-MCNC: 7.2 G/DL (ref 6.4–8.2)
PSA SERPL-MCNC: 0.3 NG/ML (ref 0–4)
RBC # BLD AUTO: 4.88 M/UL (ref 4.7–5.5)
SODIUM SERPL-SCNC: 142 MMOL/L (ref 136–145)
TRIGL SERPL-MCNC: 90 MG/DL (ref ?–150)
VLDLC SERPL CALC-MCNC: 18 MG/DL
WBC # BLD AUTO: 3.6 K/UL (ref 4.6–13.2)

## 2018-09-04 PROCEDURE — 80053 COMPREHEN METABOLIC PANEL: CPT | Performed by: INTERNAL MEDICINE

## 2018-09-04 PROCEDURE — 85025 COMPLETE CBC W/AUTO DIFF WBC: CPT | Performed by: INTERNAL MEDICINE

## 2018-09-04 PROCEDURE — 36415 COLL VENOUS BLD VENIPUNCTURE: CPT | Performed by: INTERNAL MEDICINE

## 2018-09-04 PROCEDURE — 84153 ASSAY OF PSA TOTAL: CPT | Performed by: INTERNAL MEDICINE

## 2018-09-04 PROCEDURE — 80061 LIPID PANEL: CPT | Performed by: INTERNAL MEDICINE

## 2018-09-11 ENCOUNTER — OFFICE VISIT (OUTPATIENT)
Dept: INTERNAL MEDICINE CLINIC | Age: 68
End: 2018-09-11

## 2018-09-11 VITALS
HEIGHT: 71 IN | HEART RATE: 71 BPM | BODY MASS INDEX: 25.76 KG/M2 | WEIGHT: 184 LBS | DIASTOLIC BLOOD PRESSURE: 70 MMHG | OXYGEN SATURATION: 98 % | TEMPERATURE: 97.8 F | SYSTOLIC BLOOD PRESSURE: 122 MMHG | RESPIRATION RATE: 12 BRPM

## 2018-09-11 DIAGNOSIS — R73.01 IMPAIRED FASTING GLUCOSE: ICD-10-CM

## 2018-09-11 DIAGNOSIS — M15.9 PRIMARY OSTEOARTHRITIS INVOLVING MULTIPLE JOINTS: ICD-10-CM

## 2018-09-11 DIAGNOSIS — E78.5 HYPERLIPIDEMIA LDL GOAL <100: Primary | ICD-10-CM

## 2018-09-11 DIAGNOSIS — Z23 ENCOUNTER FOR IMMUNIZATION: ICD-10-CM

## 2018-09-11 DIAGNOSIS — J30.1 CHRONIC ALLERGIC RHINITIS DUE TO POLLEN: ICD-10-CM

## 2018-09-11 DIAGNOSIS — K21.9 GASTROESOPHAGEAL REFLUX DISEASE WITHOUT ESOPHAGITIS: ICD-10-CM

## 2018-09-11 RX ORDER — HYDROCODONE BITARTRATE AND ACETAMINOPHEN 5; 325 MG/1; MG/1
1 TABLET ORAL
Qty: 60 TAB | Refills: 0 | Status: SHIPPED | OUTPATIENT
Start: 2018-09-11 | End: 2019-03-13 | Stop reason: SDUPTHER

## 2018-09-11 RX ORDER — ATORVASTATIN CALCIUM 40 MG/1
TABLET, FILM COATED ORAL
Qty: 90 TAB | Refills: 3 | Status: SHIPPED | OUTPATIENT
Start: 2018-09-11 | End: 2019-09-13 | Stop reason: SDUPTHER

## 2018-09-11 RX ORDER — PANTOPRAZOLE SODIUM 40 MG/1
40 TABLET, DELAYED RELEASE ORAL DAILY
Qty: 90 TAB | Refills: 3 | Status: SHIPPED | OUTPATIENT
Start: 2018-09-11 | End: 2019-09-13 | Stop reason: SDUPTHER

## 2018-09-11 RX ORDER — FLUTICASONE PROPIONATE 50 MCG
2 SPRAY, SUSPENSION (ML) NASAL DAILY
Qty: 3 BOTTLE | Refills: 3 | Status: SHIPPED | OUTPATIENT
Start: 2018-09-11 | End: 2019-09-13 | Stop reason: SDUPTHER

## 2018-09-11 NOTE — MR AVS SNAPSHOT
[address] 
[phone] Patient: Seth Dubois MRN: [id number] CFF:5/89/5240 Visit Information Date & Time Provider Department Dept. Phone Encounter #  
 9/11/2018  9:15 AM Rafaela Champagne MD [facility] [phone] [id number] Upcoming Health Maintenance Date Due Influenza Age 5 to Adult 8/1/2018 MEDICARE YEARLY EXAM 9/7/2018 GLAUCOMA SCREENING Q2Y 11/1/2019 COLONOSCOPY 6/11/2022 DTaP/Tdap/Td series (2 - Td) 8/20/2025 Allergies as of 9/11/2018  Review Complete On: 9/11/2018 By: Rafaela Champagne MD  
  
 Severity Noted Reaction Type Reactions Augmentin [Amoxicillin-pot Clavulanate]    Rash Medrol [Methylprednisolone]    Unknown (comments) nervous Sulfa (Sulfonamide Antibiotics)  01/19/2009    Unknown (comments) Current Immunizations  Reviewed on 9/11/2018 Name Date Influenza High Dose Vaccine PF  Incomplete, 9/6/2017  9:39 AM, 8/29/2016  9:48 AM  
 Influenza Vaccine (Quad) PF 2/19/2016  9:27 AM  
 Influenza Vaccine PF 10/29/2013 Influenza Vaccine Whole 11/1/2006 Pneumococcal Conjugate (PCV-13) 8/29/2016  9:46 AM  
 Pneumococcal Polysaccharide (PPSV-23) 2/19/2016  9:28 AM  
 TD Vaccine 1/25/1999 Tdap 8/20/2015 Zoster Vaccine, Live 8/20/2015 Reviewed by Rafaela Champagne MD on 9/11/2018 at  9:19 AM  
You Were Diagnosed With   
  
 Codes Comments Hyperlipidemia LDL goal <100    -  Primary ICD-10-CM: E78.5 ICD-9-CM: 272.4 Encounter for immunization     ICD-10-CM: A99 ICD-9-CM: V03.89 Primary osteoarthritis involving multiple joints     ICD-10-CM: M15.0 ICD-9-CM: 715.09 Impaired fasting glucose     ICD-10-CM: R73.01 
ICD-9-CM: 790.21 Gastroesophageal reflux disease without esophagitis     ICD-10-CM: K21.9 ICD-9-CM: 530.81 Chronic allergic rhinitis due to pollen     ICD-10-CM: J30.1 ICD-9-CM: 477.0 Vitals BP Pulse Temp Resp Height(growth percentile) Weight(growth percentile) 122/70 71 97.8 °F (36.6 °C) (Oral) 12 5' 11\" (1.803 m) 184 lb (83.5 kg) SpO2 BMI Smoking Status 98% 25.66 kg/m2 Former Smoker Vitals History BMI and BSA Data Body Mass Index Body Surface Area  
 25.66 kg/m 2 2.05 m 2 Preferred Pharmacy Pharmacy Name Phone  [address] [phone] Your Updated Medication List  
  
   
This list is accurate as of 18  9:37 AM.  Always use your most recent med list.  
  
  
  
  
 ASPIRIN PO Take 81 mg by mouth daily. atorvastatin 40 mg tablet Commonly known as:  LIPITOR Take one tablet by mouth each evening CENTRUM SILVER PO Take  by mouth. fluticasone 50 mcg/actuation nasal spray Commonly known as:  Jarret Riser 2 Sprays by Both Nostrils route daily. HYDROcodone-acetaminophen 5-325 mg per tablet Commonly known as:  Crystal President Take 1 Tab by mouth every four (4) hours as needed for Pain.  
  
 pantoprazole 40 mg tablet Commonly known as:  PROTONIX Take 1 Tab by mouth daily. Prescriptions Printed Refills HYDROcodone-acetaminophen (NORCO) 5-325 mg per tablet 0 Sig: Take 1 Tab by mouth every four (4) hours as needed for Pain. Class: Print Route: Oral  
  
Prescriptions Sent to Pharmacy Refills  
 pantoprazole (PROTONIX) 40 mg tablet 3 Sig: Take 1 Tab by mouth daily. Class: Normal  
 Pharmacy: [facility] [address] Ph #: [phone] Route: Oral  
 atorvastatin (LIPITOR) 40 mg tablet 3 Sig: Take one tablet by mouth each evening Class: Normal  
 Pharmacy: [address] Ph #: [phone]  
 fluticasone (FLONASE) 50 mcg/actuation nasal spray 3 Si Sprays by Both Nostrils route daily.   
 Class: Normal  
 Pharmacy: [facility] [address] Ph #: [phone] Route: Both Nostrils We Performed the Following INFLUENZA VIRUS VACCINE, HIGH DOSE SEASONAL, PRESERVATIVE FREE [32287 CPT(R)] To-Do List   
 Around 03/18/2019 Lab:  LIPID PANEL Around 03/18/2019 Lab:  METABOLIC PANEL, COMPREHENSIVE hospitals [facility] HE[facility]! Dear Elma Valencia: Thank you for requesting a [email or web address] account. Our records indicate that you already have an active [email or web address] account. You can access your account anytime at [email or web address] Did you know that you can access your hospital and ER discharge instructions at any time in [email or web address]? You can also review all of your test results from your hospital stay or ER visit. Additional Information If you have questions, please visit the Frequently Asked Questions section of the [email or web address] website at [email or web address] Remember, [email or web address] is NOT to be used for urgent needs. For medical emergencies, dial 911. Now available from your iPhone and Android! Please provide this summary of care documentation to your next provider. Your primary care clinician is listed as Tea Carpio. Carolyn Khoury. If you have any questions after today's visit, please call [phone].

## 2018-09-11 NOTE — PROGRESS NOTES
Andrew Nunez,born 1950, is a 76 y.o. male, who is seen today for reevaluation of hyperlipidemia impaired fasting glucose GERD allergic rhinitis and chronic right shoulder pain. He has had shoulder surgery 3 times on the right side and has been left with chronic pain which is quite well controlled with low-dose hydrocodone taken only occasionally. 60 tablets has lasted 6 months but he needs a refill now. 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 He does not do heavy lifting, he tries to protect his shoulder. He is eating very healthy diet and weight is stable, down just 3 pounds over the last year. He has a history of reflux but with pantoprazole he is currently having no symptoms. He uses Flonase and that controls allergic rhinitis symptoms quite well. Past Medical History:  
Diagnosis Date  Allergic rhinitis  GERD (gastroesophageal reflux disease)  Hypercholesterolemia  Hyperlipidemia  Sarcoidosis Past Surgical History:  
Procedure Laterality Date  ENDOSCOPY, COLON, DIAGNOSTIC  6/12  
 normal, followup 10 years  HX COLONOSCOPY  06/11/2012 Normal, 10 years, Dr. Patricia Parker  HX GI Hernia repair  HX HEENT    
 tonsilectomy  HX HERNIA REPAIR    
 HX ORTHOPAEDIC    
 rotator cuff repair  HX ORTHOPAEDIC    
 carpal tunnel Current Outpatient Prescriptions Medication Sig Dispense Refill  
 HYDROcodone-acetaminophen (NORCO) 5-325 mg per tablet Take 1 Tab by mouth every four (4) hours as needed for Pain. 60 Tab 0  
 pantoprazole (PROTONIX) 40 mg tablet Take 1 Tab by mouth daily. 90 Tab 3  
 atorvastatin (LIPITOR) 40 mg tablet Take one tablet by mouth each evening 90 Tab 3  
 fluticasone (FLONASE) 50 mcg/actuation nasal spray 2 Sprays by Both Nostrils route daily. 3 Bottle 3  
 FOLIC ACID/MULTIVIT-MIN/LUTEIN (CENTRUM SILVER PO) Take  by mouth.  ASPIRIN PO Take 81 mg by mouth daily. Allergies Allergen Reactions  Augmentin [Amoxicillin-Pot Clavulanate] Rash  Medrol [Methylprednisolone] Unknown (comments) nervous  Sulfa (Sulfonamide Antibiotics) Unknown (comments) Social History Social History  Marital status:  Spouse name: N/A  
 Number of children: N/A  
 Years of education: N/A Social History Main Topics  Smoking status: Former Smoker Quit date: 1/1/1980  Smokeless tobacco: Never Used  Alcohol use No  
 Drug use: No  
 Sexual activity: Not Asked Other Topics Concern  None Social History Narrative Visit Vitals  /70  Pulse 71  Temp 97.8 °F (36.6 °C) (Oral)  Resp 12  Ht 5' 11\" (1.803 m)  Wt 184 lb (83.5 kg)  SpO2 98%  BMI 25.66 kg/m2 The patient is a well-developed, well-nourished male in no apparent distress. HEENT: Pupils are equal and react to light and extraocular movements are full. Ear canals and tympanic membranes appear normal. Oral cavity appears normal with no oral lesions. Neck: Carotids are 2+ without bruits. No adenopathy or thyromegaly. Lungs are clear to percussion. I hear no wheezing, rales or rhonchi. Heart reveals a nondisplaced apical impulse in the fifth interspace at the midclavicular line. Rhythm is regular and no murmur, gallop, click or rub. Abdomen is soft and nontender with no hepatosplenomegaly or masses. No distention or tympany and normoactive bowel sounds. Extremities reveal no clubbing cyanosis or edema. Pulses are 2+ throughout. Rectal exam is normal.  The prostate is symmetric and smooth with no nodules. No suspicious skin growths. Results for orders placed or performed during the hospital encounter of 09/04/18 CBC WITH AUTOMATED DIFF Result Value Ref Range WBC 3.6 (L) 4.6 - 13.2 K/uL  
 RBC 4.88 4.70 - 5.50 M/uL  
 HGB 13.8 13.0 - 16.0 g/dL HCT 41.4 36.0 - 48.0 % MCV 84.8 74.0 - 97.0 FL  
 MCH 28.3 24.0 - 34.0 PG  
 MCHC 33.3 31.0 - 37.0 g/dL RDW 14.8 (H) 11.6 - 14.5 % PLATELET 582 067 - 070 K/uL MPV 9.9 9.2 - 11.8 FL  
 NEUTROPHILS 55 40 - 73 % LYMPHOCYTES 33 21 - 52 % MONOCYTES 9 3 - 10 % EOSINOPHILS 3 0 - 5 % BASOPHILS 0 0 - 2 %  
 ABS. NEUTROPHILS 2.0 1.8 - 8.0 K/UL  
 ABS. LYMPHOCYTES 1.2 0.9 - 3.6 K/UL  
 ABS. MONOCYTES 0.3 0.05 - 1.2 K/UL  
 ABS. EOSINOPHILS 0.1 0.0 - 0.4 K/UL  
 ABS. BASOPHILS 0.0 0.0 - 0.1 K/UL  
 DF AUTOMATED    
LIPID PANEL Result Value Ref Range LIPID PROFILE Cholesterol, total 145 <200 MG/DL Triglyceride 90 <150 MG/DL  
 HDL Cholesterol 43 40 - 60 MG/DL  
 LDL, calculated 84 0 - 100 MG/DL VLDL, calculated 18 MG/DL  
 CHOL/HDL Ratio 3.4 0 - 5.0 METABOLIC PANEL, COMPREHENSIVE Result Value Ref Range Sodium 142 136 - 145 mmol/L Potassium 4.9 3.5 - 5.5 mmol/L Chloride 108 100 - 108 mmol/L  
 CO2 27 21 - 32 mmol/L Anion gap 7 3.0 - 18 mmol/L Glucose 114 (H) 74 - 99 mg/dL BUN 19 (H) 7.0 - 18 MG/DL Creatinine 0.71 0.6 - 1.3 MG/DL  
 BUN/Creatinine ratio 27 (H) 12 - 20 GFR est AA >60 >60 ml/min/1.73m2 GFR est non-AA >60 >60 ml/min/1.73m2 Calcium 9.4 8.5 - 10.1 MG/DL Bilirubin, total 0.4 0.2 - 1.0 MG/DL  
 ALT (SGPT) 39 16 - 61 U/L  
 AST (SGOT) 28 15 - 37 U/L Alk. phosphatase 73 45 - 117 U/L Protein, total 7.2 6.4 - 8.2 g/dL Albumin 3.7 3.4 - 5.0 g/dL Globulin 3.5 2.0 - 4.0 g/dL A-G Ratio 1.1 0.8 - 1.7 PSA SCREENING (SCREENING) Result Value Ref Range Prostate Specific Ag 0.3 0.0 - 4.0 ng/mL Assessment: #1. Hyperlipidemia doing well, he will continue low-fat diet and avoid weight gain and he will continue atorvastatin 40 mg each evening. #2.  GERD asymptomatic, he will continue pantoprazole 40 mg daily. #3.  Allergic rhinitis currently with minimal symptoms, he will continue Flonase 2 sprays in each nostril daily.   #4.  Chronic pain in the right shoulder status post 3 surgeries, he will continue Norco 5 mg when needed and he uses this sparingly as noted above. He has had no side effects from the [address].  #5.  Impaired fasting glucose, higher than it was 6 months ago and higher still from a year ago when it was 97 fasting. He will continue to avoid sweets in his diet and avoid weight gain and perhaps lose just a few pounds in the coming 6 months. Follow-up 6 months with lab and he will get a flu shot now Giovanny Delgado, [address] Please note: This document has been produced using voice recognition software. Unrecognized errors in transcription may be present.

## 2018-12-20 ENCOUNTER — DOCUMENTATION ONLY (OUTPATIENT)
Dept: INTERNAL MEDICINE CLINIC | Age: 68
End: 2018-12-20

## 2019-03-07 ENCOUNTER — APPOINTMENT (OUTPATIENT)
Dept: INTERNAL MEDICINE CLINIC | Age: 69
End: 2019-03-07

## 2019-03-07 ENCOUNTER — HOSPITAL ENCOUNTER (OUTPATIENT)
Dept: LAB | Age: 69
Discharge: HOME | End: 2019-03-07
Payer: COMMERCIAL

## 2019-03-07 DIAGNOSIS — E78.5 HYPERLIPIDEMIA LDL GOAL <100: ICD-10-CM

## 2019-03-07 LAB
ALBUMIN SERPL-MCNC: 4.1 G/DL (ref 3.4–5)
ALBUMIN/GLOB SERPL: 1.3 {RATIO} (ref 0.8–1.7)
ALP SERPL-CCNC: 70 U/L (ref 45–117)
ALT SERPL-CCNC: 39 U/L (ref 16–61)
ANION GAP SERPL CALC-SCNC: 10 MMOL/L (ref 3–18)
AST SERPL-CCNC: 25 U/L (ref 15–37)
BILIRUB SERPL-MCNC: 0.5 MG/DL (ref 0.2–1)
BUN SERPL-MCNC: 16 MG/DL (ref 7–18)
BUN/CREAT SERPL: 21 (ref 12–20)
CALCIUM SERPL-MCNC: 9.1 MG/DL (ref 8.5–10.1)
CHLORIDE SERPL-SCNC: 104 MMOL/L (ref 100–108)
CHOLEST SERPL-MCNC: 179 MG/DL
CO2 SERPL-SCNC: 27 MMOL/L (ref 21–32)
CREAT SERPL-MCNC: 0.76 MG/DL (ref 0.6–1.3)
GLOBULIN SER CALC-MCNC: 3.1 G/DL (ref 2–4)
GLUCOSE SERPL-MCNC: 107 MG/DL (ref 74–99)
HDLC SERPL-MCNC: 43 MG/DL (ref 40–60)
HDLC SERPL: 4.2 {RATIO} (ref 0–5)
LDLC SERPL CALC-MCNC: 103 MG/DL (ref 0–100)
LIPID PROFILE,FLP: ABNORMAL
POTASSIUM SERPL-SCNC: 4.3 MMOL/L (ref 3.5–5.5)
PROT SERPL-MCNC: 7.2 G/DL (ref 6.4–8.2)
SODIUM SERPL-SCNC: 141 MMOL/L (ref 136–145)
TRIGL SERPL-MCNC: 165 MG/DL (ref ?–150)
VLDLC SERPL CALC-MCNC: 33 MG/DL

## 2019-03-07 PROCEDURE — 36415 COLL VENOUS BLD VENIPUNCTURE: CPT

## 2019-03-07 PROCEDURE — 80061 LIPID PANEL: CPT

## 2019-03-07 PROCEDURE — 80053 COMPREHEN METABOLIC PANEL: CPT

## 2019-03-13 ENCOUNTER — OFFICE VISIT (OUTPATIENT)
Dept: INTERNAL MEDICINE CLINIC | Age: 69
End: 2019-03-13

## 2019-03-13 VITALS
HEART RATE: 73 BPM | TEMPERATURE: 97.5 F | DIASTOLIC BLOOD PRESSURE: 70 MMHG | WEIGHT: 185.8 LBS | BODY MASS INDEX: 26.01 KG/M2 | RESPIRATION RATE: 12 BRPM | SYSTOLIC BLOOD PRESSURE: 126 MMHG | HEIGHT: 71 IN | OXYGEN SATURATION: 98 %

## 2019-03-13 DIAGNOSIS — Z00.00 MEDICARE ANNUAL WELLNESS VISIT, SUBSEQUENT: ICD-10-CM

## 2019-03-13 DIAGNOSIS — Z13.31 SCREENING FOR DEPRESSION: ICD-10-CM

## 2019-03-13 DIAGNOSIS — E78.5 HYPERLIPIDEMIA LDL GOAL <100: Primary | ICD-10-CM

## 2019-03-13 DIAGNOSIS — R73.01 IMPAIRED FASTING GLUCOSE: ICD-10-CM

## 2019-03-13 DIAGNOSIS — M15.9 PRIMARY OSTEOARTHRITIS INVOLVING MULTIPLE JOINTS: ICD-10-CM

## 2019-03-13 DIAGNOSIS — Z12.5 PROSTATE CANCER SCREENING: ICD-10-CM

## 2019-03-13 DIAGNOSIS — K21.9 GASTROESOPHAGEAL REFLUX DISEASE WITHOUT ESOPHAGITIS: ICD-10-CM

## 2019-03-13 RX ORDER — HYDROCODONE BITARTRATE AND ACETAMINOPHEN 5; 325 MG/1; MG/1
1 TABLET ORAL
Qty: 60 TAB | Refills: 0 | Status: SHIPPED | OUTPATIENT
Start: 2019-03-13 | End: 2019-09-13 | Stop reason: SDUPTHER

## 2019-03-13 NOTE — PROGRESS NOTES
Mone Nunez,born 1950, is a 71 y.o. male, who is seen today for reevaluation of hyperlipidemia allergic rhinitis impaired fasting glucose and arthritis. He feels generally well. He has arthritis and occasionally uses Norco, he is used less than 60 in the last 6 months but needs a refill. He is following his diet and taking his medicine regularly. He has noticed a slightly loose cough over the last few days but no fever or dyspnea. He is having no reflux as he continues pantoprazole. Past Medical History:   Diagnosis Date    Allergic rhinitis     GERD (gastroesophageal reflux disease)     Hypercholesterolemia     Hyperlipidemia     Sarcoidosis      Current Outpatient Medications   Medication Sig Dispense Refill    HYDROcodone-acetaminophen (NORCO) 5-325 mg per tablet Take 1 Tab by mouth every four (4) hours as needed for Pain for up to 30 days. Max Daily Amount: 6 Tabs. 60 Tab 0    pantoprazole (PROTONIX) 40 mg tablet Take 1 Tab by mouth daily. 90 Tab 3    atorvastatin (LIPITOR) 40 mg tablet Take one tablet by mouth each evening 90 Tab 3    fluticasone (FLONASE) 50 mcg/actuation nasal spray 2 Sprays by Both Nostrils route daily. 3 Bottle 3    FOLIC ACID/MULTIVIT-MIN/LUTEIN (CENTRUM SILVER PO) Take  by mouth.  ASPIRIN PO Take 81 mg by mouth daily. Visit Vitals  /70   Pulse 73   Temp 97.5 °F (36.4 °C) (Oral)   Resp 12   Ht 5' 11\" (1.803 m)   Wt 185 lb 12.8 oz (84.3 kg)   SpO2 98%   BMI 25.91 kg/m²     Carotids are 2+ without bruits. No adenopathy or thyromegaly. Lungs are clear to percussion. Good breath sounds with no wheezing or crackles. Heart reveals a regular rhythm with normal S1 and S2 no murmur gallop click or rub. Apical impulse is not palpable. Abdomen is soft and nontender with no hepatosplenic megaly or masses and no bruits. Extremities reveal no clubbing cyanosis or edema. Pulses are 2+.     Results for orders placed or performed during the hospital encounter of 03/07/19   LIPID PANEL   Result Value Ref Range    LIPID PROFILE          Cholesterol, total 179 <200 MG/DL    Triglyceride 165 (H) <150 MG/DL    HDL Cholesterol 43 40 - 60 MG/DL    LDL, calculated 103 (H) 0 - 100 MG/DL    VLDL, calculated 33 MG/DL    CHOL/HDL Ratio 4.2 0 - 5.0     METABOLIC PANEL, COMPREHENSIVE   Result Value Ref Range    Sodium 141 136 - 145 mmol/L    Potassium 4.3 3.5 - 5.5 mmol/L    Chloride 104 100 - 108 mmol/L    CO2 27 21 - 32 mmol/L    Anion gap 10 3.0 - 18 mmol/L    Glucose 107 (H) 74 - 99 mg/dL    BUN 16 7.0 - 18 MG/DL    Creatinine 0.76 0.6 - 1.3 MG/DL    BUN/Creatinine ratio 21 (H) 12 - 20      GFR est AA >60 >60 ml/min/1.73m2    GFR est non-AA >60 >60 ml/min/1.73m2    Calcium 9.1 8.5 - 10.1 MG/DL    Bilirubin, total 0.5 0.2 - 1.0 MG/DL    ALT (SGPT) 39 16 - 61 U/L    AST (SGOT) 25 15 - 37 U/L    Alk. phosphatase 70 45 - 117 U/L    Protein, total 7.2 6.4 - 8.2 g/dL    Albumin 4.1 3.4 - 5.0 g/dL    Globulin 3.1 2.0 - 4.0 g/dL    A-G Ratio 1.3 0.8 - 1.7       Assessment: #1. Hyperlipidemia not quite as well controlled but doing quite well, he will continue low-fat diet and atorvastatin 40 mg daily. #2.  Allergic rhinitis doing well, he will continue Flonase. #3.  Impaired fasting glucose, he is going to be working on losing a few pounds over the next 6 months. #4.  DJD stable, he will continue using Norco 5 mg only as needed. #5.  GERD asymptomatic, he will continue pantoprazole 40 mg daily. He had a Medicare wellness evaluation this morning and I agree with Ezra's note. Follow-up in 6 months for complete evaluation or sooner if needed    Giovanny Kasper MD FACP    Please note: This document has been produced using voice recognition software. Unrecognized errors in transcription may be present.

## 2019-03-13 NOTE — PATIENT INSTRUCTIONS
Medicare Part B Preventive Services Limitations Recommendation Follow-up Action Needed    Bone Mass Measurement  (age 72 & older, biennial) Requires diagnosis related to osteoporosis or estrogen deficiency. Biennial benefit unless patient has history of long-term glucocorticoid tx or baseline is needed because initial test was by other method Last: N/A    A DEXA scan is recommended after you turn 72years of age to determine your risk for osteoporosis Next: As recommended by provider or specialist    Colorectal Cancer Screening  -Fecal occult blood test (annual)  -Flexible sigmoidoscopy (5y)  -Screening colonoscopy (10y)  -Barium Enema Usually recommended for patients age 48 - 78  Last: 6/11/12    Every 5-10 years depending on your colonoscopy result starting at age 48  Next: 8 year follow up due 6/11/2022   Glaucoma Screening (no USPSTF recommendation) Diabetes mellitus, family history, , age 48 or over,  American, age 72 or over Last: 1/2019    Every year, or as directed by provider Next: Follow up as recommended by primary care provider and/or specialist or at least every 2 years    Prostate Cancer Screening (annually up to age 76)  - Digital rectal exam (MEME)  - Prostate specific antigen (PSA) Annually (age 48 or over), MEME not paid separately when covered E/M service is provided on same date Last: N/A Next: Discuss with your doctor if this screening is appropriate for you.    Cardiovascular Screening Blood Tests (every 5 years)  Total cholesterol, HDL, Triglycerides Order as a panel if possible Last: 3/7/19    Every Year Next: Follow up as recommended by primary care provider and/or specialist    Diabetes Screening Tests (at least every 3 years, Medicare covers annually or at 6-month intervals for prediabetic patients)    Fasting blood sugar (FBS) or glucose tolerance test (GTT) Patient must be diagnosed with one of the following:  -Hypertension, Dyslipidemia, obesity, previous impaired FBS or GTT  Or any two of the following: overweight, FH of diabetes, age ? 72, history of gestational diabetes, birth of baby weighing more than 9 pounds Last: 3/7/19    Every 3-6 months depending on your result    Every 3 years Next: Follow up as recommended by primary care provider and/or specialist    Diabetes Self-Management Training (DSMT) (no USPSTF recommendation) Requires referral by treating physician for patient with diabetes or renal disease. 10 hours of initial DSMT session of no less than 30 minutes each in a continuous 12-month period. 2 hours of follow-up DSMT in subsequent years. Last: N/A Talk to your doctor if you are interested in a refresher course. Medical Nutrition Therapy (MNT) (for diabetes or renal disease not recommended schedule) Requires referral by treating physician for patient with diabetes or renal disease. Can be provided in same year as diabetes self-management training (DSMT), and CMS recommends medical nutrition therapy take place after DSMT. Up to 3 hours for initial year and 2 hours in subsequent years. Last: N/A Talk to your doctor if you are interested in a refresher course.    Seasonal Influenza Vaccination (annually)  Last: 9/11/18    Every Fall Please consider getting one every fall   Pneumococcal Vaccination (once after 72)  Last:  Pneumovax: 2/19/10    Prevnar-13: 8/29/16   Next: Vaccination series complete   Shingles Vaccination  Last: 8/20/15    A shingles vaccine is recommended once in a lifetime after age 61 Shingrix available from your local pharmacy, CDC recommends getting this even if you have gotten the previous shingles vaccine   Tetanus, Diphtheria and Pertussis (Tdap) Vaccination Booster One Booster as an adult and then tetanus every 10 years or as indicated Last: 8/20/15 Addressed    Hepatitis B Vaccinations (if medium/high risk) Medium/high risk factors:  End-stage renal disease,  Hemophiliacs who received Factor VIII or IX concentrates, Clients of institutions for the mentally retarded, Persons who live in the same house as a HepB virus carrier, Homosexual men, Illicit injectable drug abusers. Last: N/A Next: N/A   Counseling to Prevent Tobacco Use (up to 8 sessions per year)  - Counseling greater than 3 and up to 10 minutes  - Counseling greater than 10 minutes Patients must be asymptomatic of tobacco-related conditions to receive as preventive service  As recommended by your PCP or Specialist   Human Immunodeficiency Virus (HIV) Screening (annually for increased risk patients)  HIV-1 and HIV-2 by EIA, LIBERTAD, rapid antibody test, or oral mucosa transudate Patient must be at increased risk for HIV infection per USPSTF guidelines or pregnant. Tests covered annually for patients at increased risk. Pregnant patients may receive up to 3 test during pregnancy. As recommended by your PCP or Specialist   Ultrasound Screening for Abdominal Aortic Aneurysm (AAA) once Patient must be referred through IPPE and not have had a screening for abdominal aortic aneurysm before under medicare. Limited to patients who meet onf of the following criteria:  -Men who are 73-68 years old and have smoked more than 100 cigarettes in their lifetime  -Anyone with a FH of AAA  -Anyone recommended for screening by the USPSFTF    As recommended by your PCP or Specialist     NA = Not Applicable; NI= Not Indicated     Advance Directives: Care Instructions  Your Care Instructions  An advance directive is a legal way to state your wishes at the end of your life. It tells your family and your doctor what to do if you can no longer say what you want. There are two main types of advance directives. You can change them any time that your wishes change. · A living will tells your family and your doctor your wishes about life support and other treatment. · A durable power of  for health care lets you name a person to make treatment decisions for you when you can't speak for yourself. This person is called a health care agent. If you do not have an advance directive, decisions about your medical care may be made by a doctor or a  who doesn't know you. It may help to think of an advance directive as a gift to the people who care for you. If you have one, they won't have to make tough decisions by themselves. Follow-up care is a key part of your treatment and safety. Be sure to make and go to all appointments, and call your doctor if you are having problems. It's also a good idea to know your test results and keep a list of the medicines you take. How can you care for yourself at home? · Discuss your wishes with your loved ones and your doctor. This way, there are no surprises. · Many states have a unique form. Or you might use a universal form that has been approved by many states. This kind of form can sometimes be completed and stored online. Your electronic copy will then be available wherever you have a connection to the Internet. In most cases, doctors will respect your wishes even if you have a form from a different state. · You don't need a  to do an advance directive. But you may want to get legal advice. · Think about these questions when you prepare an advance directive:  ¨ Who do you want to make decisions about your medical care if you are not able to? Many people choose a family member or close friend. ¨ Do you know enough about life support methods that might be used? If not, talk to your doctor so you understand. ¨ What are you most afraid of that might happen? You might be afraid of having pain, losing your independence, or being kept alive by machines. ¨ Where would you prefer to die? Choices include your home, a hospital, or a nursing home. ¨ Would you like to have information about hospice care to support you and your family? ¨ Do you want to donate organs when you die? ¨ Do you want certain Hinduism practices performed before you die?  If so, put your wishes in the advance directive. · Read your advance directive every year, and make changes as needed. When should you call for help? Be sure to contact your doctor if you have any questions. Where can you learn more? Go to http://jeffrey-merrill.info/. Enter R264 in the search box to learn more about \"Advance Directives: Care Instructions. \"  Current as of: September 24, 2016  Content Version: 11.4  © 9311-7984 [employer]. Care instructions adapted under license by [employer] (which disclaims liability or warranty for this information). If you have questions about a medical condition or this instruction, always ask your healthcare professional. [address] any warranty or liability for your use of this information.

## 2019-03-13 NOTE — PROGRESS NOTES
Dr. Nika Malvae referred Anupama Lindquist (1950) a 71 y.o. male for a Medicare Annual Wellness Visit ([phone])    This is a Subsequent Medicare Annual Wellness Visit providing Personalized Prevention Plan Services (PPPS) (Performed 12 months after initial AWV and PPPS )    I have reviewed the patient's medical history in detail and updated the computerized patient record. History     Past Medical History:   Diagnosis Date    Allergic rhinitis     GERD (gastroesophageal reflux disease)     Hypercholesterolemia     Hyperlipidemia     Sarcoidosis       Past Surgical History:   Procedure Laterality Date    ENDOSCOPY, COLON, DIAGNOSTIC  6/12    normal, followup 10 years    HX COLONOSCOPY  06/11/2012    Normal, 10 years, Dr. Lili Warren HX GI      Hernia repair    HX HEENT      tonsilectomy    HX HERNIA REPAIR      HX ORTHOPAEDIC      rotator cuff repair    HX ORTHOPAEDIC      carpal tunnel     Current Outpatient Medications   Medication Sig Dispense Refill    HYDROcodone-acetaminophen (NORCO) 5-325 mg per tablet Take 1 Tab by mouth every four (4) hours as needed for Pain for up to 30 days. Max Daily Amount: 6 Tabs. 60 Tab 0    pantoprazole (PROTONIX) 40 mg tablet Take 1 Tab by mouth daily. 90 Tab 3    atorvastatin (LIPITOR) 40 mg tablet Take one tablet by mouth each evening 90 Tab 3    fluticasone (FLONASE) 50 mcg/actuation nasal spray 2 Sprays by Both Nostrils route daily. 3 Bottle 3    FOLIC ACID/MULTIVIT-MIN/LUTEIN (CENTRUM SILVER PO) Take  by mouth.  ASPIRIN PO Take 81 mg by mouth daily.        Allergies   Allergen Reactions    Augmentin [Amoxicillin-Pot Clavulanate] Rash    Medrol [Methylprednisolone] Unknown (comments)     nervous    Sulfa (Sulfonamide Antibiotics) Unknown (comments)     Family History   Problem Relation Age of Onset    No Known Problems Mother     Heart Disease Father      Social History     Tobacco Use    Smoking status: Former Smoker Packs/day: 5.00     Years: 15.00     Pack years: 75.00     Last attempt to quit: 1980     Years since quittin.2    Smokeless tobacco: Never Used   Substance Use Topics    Alcohol use: No     Patient Active Problem List   Diagnosis Code    Sarcoidosis D86.9    Impaired fasting glucose R73.01    Hyperlipidemia LDL goal <100 E78.5    Gastroesophageal reflux disease without esophagitis K21.9    Chronic allergic rhinitis due to pollen J30.1       Depression Risk Factor Screening:     3 most recent PHQ Screens 3/13/2019   Little interest or pleasure in doing things Not at all   Feeling down, depressed, irritable, or hopeless Not at all   Total Score PHQ 2 0     Alcohol Risk Factor Screening: You do not drink alcohol or very rarely. Functional Ability and Level of Safety:     Hearing Loss   Hearing is good. See's specialist     Activities of Daily Living   The home contains: no safety equipment  Patient does total self care    Requires assistance with: no ADLs    ADL Assessment 3/13/2019   Feeding yourself No Help Needed   Getting from bed to chair No Help Needed   Getting dressed No Help Needed   Bathing or showering No Help Needed   Walk across the room (includes cane/walker) No Help Needed   Using the telphone No Help Needed   Taking your medications No Help Needed   Preparing meals No Help Needed   Managing money (expenses/bills) No Help Needed   Moderately strenuous housework (laundry) No Help Needed   Shopping for personal items (toiletries/medicines) No Help Needed   Shopping for groceries No Help Needed   Driving No Help Needed   Climbing a flight of stairs No Help Needed   Getting to places beyond walking distances No Help Needed       Fall Risk     Fall Risk Assessment, last 12 mths 3/13/2019   Able to walk? Yes   Fall in past 12 months? No     Abuse Screen   Patient is not abused    Abuse Screening Questionnaire 3/13/2019   Do you ever feel afraid of your partner?  N   Are you in a relationship with someone who physically or mentally threatens you? N   Is it safe for you to go home? Y       Cognitive Screening     Evaluation of Cognitive Function:  Has your family/caregiver stated any concerns about your memory: no  Normal    Mood/affect:  neutral  Appearance: age appropriate and within normal Limits  Family member/caregiver input: N/A     No exam performed by pharmacist today, not required for Medicare Annual Wellness Visit. Patient to be seen by Dr. Yariel Lloyd separately. Patient Care Team:  Yariel Lloyd MD as PCP - General (Internal Medicine)  Kory Scruggs MD (Ophthalmology)  Marc Bacon MD (Otolaryngology)  Ozzy Diaz DC (Chiropractic Medicine)    Advice/Referrals/Counseling   Education and counseling provided:  End-of-Life planning (with patient's consent)  Pneumococcal Vaccine  Influenza Vaccine  Prostate cancer screening tests (PSA, covered annually)  Colorectal cancer screening tests  Cardiovascular screening blood test  Screening for glaucoma  Diabetes screening test  Tdap / Zoster vaccination recommendations     Assessment/Plan       ICD-10-CM ICD-9-CM    1. Hyperlipidemia LDL goal <100 E78.5 272.4 LIPID PANEL      METABOLIC PANEL, COMPREHENSIVE   2. Primary osteoarthritis involving multiple joints M15.0 715.09 HYDROcodone-acetaminophen (NORCO) 5-325 mg per tablet   3. Gastroesophageal reflux disease without esophagitis K21.9 530.81 CBC WITH AUTOMATED DIFF   4. Impaired fasting glucose R73.01 790.21    5. Prostate cancer screening Z12.5 V76.44 PSA SCREENING (SCREENING)   . 6. Medication Reconciliation: Performed today and patient did not bring his medications to the visit. and the following changes were made. Discontinued: below   Additions: none    Changes / other discrepancies: none     Medications Discontinued During This Encounter   Medication Reason    HYDROcodone-acetaminophen (NORCO) 5-325 mg per tablet Reorder       7.  Immunizations: Patient confirmed the following records of vaccinations are correct and current.   -Pneumococcal: Vaccination series complete   -Influenza: Done      -Zoster:  Educated about Shingrix    -Tdap:  Done     Immunization History   Administered Date(s) Administered    Influenza High Dose Vaccine PF 08/29/2016, 09/06/2017, 09/11/2018    Influenza Vaccine (Quad) PF 02/19/2016    Influenza Vaccine PF 10/29/2013    Influenza Vaccine Whole 11/01/2006    Pneumococcal Conjugate (PCV-13) 08/29/2016    Pneumococcal Polysaccharide (PPSV-23) 02/19/2016    TD Vaccine 01/25/1999    Tdap 08/20/2015    Zoster Vaccine, Live 08/20/2015       8. Screenings: (see patient instructions for chart/information)     9. Advanced Care Planning: Patient educated on the importance of an advanced care plan and paperwork was given to the patient today. Asked patient to read over the information and bring it back to his next appointment with his physician. Patient verbalized understanding of information presented. Answered all of the patient's questions. AVS information was reviewed with patient and will be printed on checkout.     Avelino Ganser, PharmD, BCPS, BCACP, BC-ADM

## 2019-08-27 ENCOUNTER — APPOINTMENT (OUTPATIENT)
Dept: INTERNAL MEDICINE CLINIC | Age: 69
End: 2019-08-27

## 2019-08-27 ENCOUNTER — HOSPITAL ENCOUNTER (OUTPATIENT)
Dept: LAB | Age: 69
Discharge: HOME | End: 2019-08-27
Payer: MEDICARE

## 2019-08-27 DIAGNOSIS — E78.5 HYPERLIPIDEMIA LDL GOAL <100: ICD-10-CM

## 2019-08-27 DIAGNOSIS — K21.9 GASTROESOPHAGEAL REFLUX DISEASE WITHOUT ESOPHAGITIS: ICD-10-CM

## 2019-08-27 DIAGNOSIS — Z12.5 PROSTATE CANCER SCREENING: ICD-10-CM

## 2019-08-27 LAB
ALBUMIN SERPL-MCNC: 4.1 G/DL (ref 3.4–5)
ALBUMIN/GLOB SERPL: 1.4 {RATIO} (ref 0.8–1.7)
ALP SERPL-CCNC: 84 U/L (ref 45–117)
ALT SERPL-CCNC: 37 U/L (ref 16–61)
ANION GAP SERPL CALC-SCNC: 5 MMOL/L (ref 3–18)
AST SERPL-CCNC: 28 U/L (ref 10–38)
BASOPHILS # BLD: 0 K/UL (ref 0–0.1)
BASOPHILS NFR BLD: 0 % (ref 0–2)
BILIRUB SERPL-MCNC: 0.4 MG/DL (ref 0.2–1)
BUN SERPL-MCNC: 22 MG/DL (ref 7–18)
BUN/CREAT SERPL: 26 (ref 12–20)
CALCIUM SERPL-MCNC: 9.9 MG/DL (ref 8.5–10.1)
CHLORIDE SERPL-SCNC: 106 MMOL/L (ref 100–111)
CO2 SERPL-SCNC: 26 MMOL/L (ref 21–32)
CREAT SERPL-MCNC: 0.84 MG/DL (ref 0.6–1.3)
DIFFERENTIAL METHOD BLD: ABNORMAL
EOSINOPHIL # BLD: 0.2 K/UL (ref 0–0.4)
EOSINOPHIL NFR BLD: 4 % (ref 0–5)
ERYTHROCYTE [DISTWIDTH] IN BLOOD BY AUTOMATED COUNT: 14.9 % (ref 11.6–14.5)
GLOBULIN SER CALC-MCNC: 3 G/DL (ref 2–4)
GLUCOSE SERPL-MCNC: 101 MG/DL (ref 74–99)
HCT VFR BLD AUTO: 41.3 % (ref 36–48)
HGB BLD-MCNC: 13.5 G/DL (ref 13–16)
LYMPHOCYTES # BLD: 1.3 K/UL (ref 0.9–3.6)
LYMPHOCYTES NFR BLD: 28 % (ref 21–52)
MCH RBC QN AUTO: 27.4 PG (ref 24–34)
MCHC RBC AUTO-ENTMCNC: 32.7 G/DL (ref 31–37)
MCV RBC AUTO: 83.8 FL (ref 74–97)
MONOCYTES # BLD: 0.4 K/UL (ref 0.05–1.2)
MONOCYTES NFR BLD: 9 % (ref 3–10)
NEUTS SEG # BLD: 2.8 K/UL (ref 1.8–8)
NEUTS SEG NFR BLD: 59 % (ref 40–73)
PLATELET # BLD AUTO: 207 K/UL (ref 135–420)
PMV BLD AUTO: 11 FL (ref 9.2–11.8)
POTASSIUM SERPL-SCNC: 3.9 MMOL/L (ref 3.5–5.5)
PROT SERPL-MCNC: 7.1 G/DL (ref 6.4–8.2)
RBC # BLD AUTO: 4.93 M/UL (ref 4.7–5.5)
SODIUM SERPL-SCNC: 137 MMOL/L (ref 136–145)
WBC # BLD AUTO: 4.7 K/UL (ref 4.6–13.2)

## 2019-08-27 PROCEDURE — 85025 COMPLETE CBC W/AUTO DIFF WBC: CPT

## 2019-08-27 PROCEDURE — 80053 COMPREHEN METABOLIC PANEL: CPT

## 2019-08-27 PROCEDURE — 80061 LIPID PANEL: CPT

## 2019-08-27 PROCEDURE — 84153 ASSAY OF PSA TOTAL: CPT

## 2019-08-27 PROCEDURE — 36415 COLL VENOUS BLD VENIPUNCTURE: CPT

## 2019-08-28 LAB
CHOLEST SERPL-MCNC: 152 MG/DL
HDLC SERPL-MCNC: 42 MG/DL (ref 40–60)
HDLC SERPL: 3.6 {RATIO} (ref 0–5)
LDLC SERPL CALC-MCNC: 91.6 MG/DL (ref 0–100)
LIPID PROFILE,FLP: NORMAL
PSA SERPL-MCNC: 0.3 NG/ML (ref 0–4)
TRIGL SERPL-MCNC: 92 MG/DL (ref ?–150)
VLDLC SERPL CALC-MCNC: 18.4 MG/DL

## 2019-09-13 ENCOUNTER — OFFICE VISIT (OUTPATIENT)
Dept: INTERNAL MEDICINE CLINIC | Age: 69
End: 2019-09-13

## 2019-09-13 VITALS
HEIGHT: 71 IN | HEART RATE: 72 BPM | TEMPERATURE: 96.1 F | DIASTOLIC BLOOD PRESSURE: 72 MMHG | SYSTOLIC BLOOD PRESSURE: 130 MMHG | BODY MASS INDEX: 26.21 KG/M2 | WEIGHT: 187.2 LBS | OXYGEN SATURATION: 98 % | RESPIRATION RATE: 14 BRPM

## 2019-09-13 DIAGNOSIS — E78.5 HYPERLIPIDEMIA LDL GOAL <100: ICD-10-CM

## 2019-09-13 DIAGNOSIS — Z00.00 ROUTINE GENERAL MEDICAL EXAMINATION AT A HEALTH CARE FACILITY: Primary | ICD-10-CM

## 2019-09-13 DIAGNOSIS — M15.9 PRIMARY OSTEOARTHRITIS INVOLVING MULTIPLE JOINTS: ICD-10-CM

## 2019-09-13 DIAGNOSIS — K21.9 GASTROESOPHAGEAL REFLUX DISEASE WITHOUT ESOPHAGITIS: ICD-10-CM

## 2019-09-13 DIAGNOSIS — R73.01 IMPAIRED FASTING GLUCOSE: ICD-10-CM

## 2019-09-13 RX ORDER — ATORVASTATIN CALCIUM 40 MG/1
TABLET, FILM COATED ORAL
Qty: 90 TAB | Refills: 3 | Status: SHIPPED | COMMUNITY
Start: 2019-09-13 | End: 2020-10-20 | Stop reason: SDUPTHER

## 2019-09-13 RX ORDER — FLUTICASONE PROPIONATE 50 MCG
2 SPRAY, SUSPENSION (ML) NASAL DAILY
Qty: 3 BOTTLE | Refills: 3 | Status: SHIPPED | COMMUNITY
Start: 2019-09-13 | End: 2020-10-20 | Stop reason: SDUPTHER

## 2019-09-13 RX ORDER — HYDROCODONE BITARTRATE AND ACETAMINOPHEN 5; 325 MG/1; MG/1
1 TABLET ORAL
Qty: 30 TAB | Refills: 0 | Status: SHIPPED | OUTPATIENT
Start: 2019-09-13 | End: 2019-09-13 | Stop reason: SDUPTHER

## 2019-09-13 RX ORDER — PANTOPRAZOLE SODIUM 40 MG/1
40 TABLET, DELAYED RELEASE ORAL DAILY
Qty: 90 TAB | Refills: 3 | Status: SHIPPED | COMMUNITY
Start: 2019-09-13 | End: 2021-01-12 | Stop reason: SDUPTHER

## 2019-09-13 RX ORDER — HYDROCODONE BITARTRATE AND ACETAMINOPHEN 5; 325 MG/1; MG/1
1 TABLET ORAL
Qty: 30 TAB | Refills: 0 | Status: SHIPPED | OUTPATIENT
Start: 2019-10-11 | End: 2019-10-18

## 2019-09-13 NOTE — PROGRESS NOTES
Elliott Nunez,born 1950, is a 71 y.o. male, who is seen today for a routine physical exam and follow-up on hyperlipidemia GERD impaired fasting glucose allergic rhinitis and arthritic pain. He has had shoulder surgery on the right in the past still has some arthritic pain and occasionally uses Norco 5 mg. He needs a refill on that. 60 tablets has lasted him 6 months but he needs it split up this time according to his pharmacy. He is having no chest pain or dyspnea or other new complaints. He follows a healthy diet and takes his medication regularly. He is having no reflux as he continues pantoprazole and his allergies are well controlled. Past Medical History:   Diagnosis Date    Allergic rhinitis     GERD (gastroesophageal reflux disease)     Hypercholesterolemia     Hyperlipidemia     Sarcoidosis      Past Surgical History:   Procedure Laterality Date    ENDOSCOPY, COLON, DIAGNOSTIC  6/12    normal, followup 10 years    HX COLONOSCOPY  06/11/2012    Normal, 10 years, Dr. Jose Gonzalez HX GI      Hernia repair    HX HEENT      tonsilectomy    HX HERNIA REPAIR      HX ORTHOPAEDIC      rotator cuff repair    HX ORTHOPAEDIC      carpal tunnel     Current Outpatient Medications   Medication Sig Dispense Refill    pantoprazole (PROTONIX) 40 mg tablet Take 1 Tab by mouth daily. 90 Tab 3    atorvastatin (LIPITOR) 40 mg tablet Take one tablet by mouth each evening 90 Tab 3    fluticasone propionate (FLONASE) 50 mcg/actuation nasal spray 2 Sprays by Both Nostrils route daily. 3 Bottle 3    [START ON 10/11/2019] HYDROcodone-acetaminophen (NORCO) 5-325 mg per tablet Take 1 Tab by mouth every four (4) hours as needed for Pain for up to 7 days. Max Daily Amount: 6 Tabs. 30 Tab 0    FOLIC ACID/MULTIVIT-MIN/LUTEIN (CENTRUM SILVER PO) Take  by mouth daily.  ASPIRIN PO Take 81 mg by mouth daily.        Allergies   Allergen Reactions    Augmentin [Amoxicillin-Pot Clavulanate] Rash    Medrol [Methylprednisolone] Unknown (comments)     nervous    Sulfa (Sulfonamide Antibiotics) Unknown (comments)     Social History     Socioeconomic History    Marital status:      Spouse name: Not on file    Number of children: Not on file    Years of education: Not on file    Highest education level: Not on file   Tobacco Use    Smoking status: Former Smoker     Packs/day: 5.00     Years: 15.00     Pack years: 75.00     Types: Cigarettes, Pipe, Cigars     Last attempt to quit: 1980     Years since quittin.7    Smokeless tobacco: Never Used   Substance and Sexual Activity    Alcohol use: No    Drug use: No     Visit Vitals  /72 (BP 1 Location: Left arm, BP Patient Position: Sitting)   Pulse 72   Temp 96.1 °F (35.6 °C) (Oral)   Resp 14   Ht 5' 11\" (1.803 m)   Wt 187 lb 3.2 oz (84.9 kg)   SpO2 98%   BMI 26.11 kg/m²     Left ear canal and tympanic membrane are normal, moderate amount of wax on the right but normal-appearing tympanic membrane. Oral cavity reveals no lesions. Neck reveals no adenopathy or thyromegaly. Carotids are 2+ without bruits. Lungs are clear to percussion. Good breath sounds with no wheezing or crackles. Heart reveals a regular rhythm with normal S1 and S2 no murmur gallop click or rub. Apical impulse is not palpable. Abdomen is soft and nontender with no hepatosplenomegaly or masses and no bruits. Extremities reveal no clubbing cyanosis or edema. Pulses are 2+ throughout.     Results for orders placed or performed during the hospital encounter of 19   CBC WITH AUTOMATED DIFF   Result Value Ref Range    WBC 4.7 4.6 - 13.2 K/uL    RBC 4.93 4.70 - 5.50 M/uL    HGB 13.5 13.0 - 16.0 g/dL    HCT 41.3 36.0 - 48.0 %    MCV 83.8 74.0 - 97.0 FL    MCH 27.4 24.0 - 34.0 PG    MCHC 32.7 31.0 - 37.0 g/dL    RDW 14.9 (H) 11.6 - 14.5 %    PLATELET 881 638 - 540 K/uL    MPV 11.0 9.2 - 11.8 FL    NEUTROPHILS 59 40 - 73 %    LYMPHOCYTES 28 21 - 52 %    MONOCYTES 9 3 - 10 % EOSINOPHILS 4 0 - 5 %    BASOPHILS 0 0 - 2 %    ABS. NEUTROPHILS 2.8 1.8 - 8.0 K/UL    ABS. LYMPHOCYTES 1.3 0.9 - 3.6 K/UL    ABS. MONOCYTES 0.4 0.05 - 1.2 K/UL    ABS. EOSINOPHILS 0.2 0.0 - 0.4 K/UL    ABS. BASOPHILS 0.0 0.0 - 0.1 K/UL    DF AUTOMATED     LIPID PANEL   Result Value Ref Range    LIPID PROFILE          Cholesterol, total 152 <200 MG/DL    Triglyceride 92 <150 MG/DL    HDL Cholesterol 42 40 - 60 MG/DL    LDL, calculated 91.6 0 - 100 MG/DL    VLDL, calculated 18.4 MG/DL    CHOL/HDL Ratio 3.6 0 - 5.0     METABOLIC PANEL, COMPREHENSIVE   Result Value Ref Range    Sodium 137 136 - 145 mmol/L    Potassium 3.9 3.5 - 5.5 mmol/L    Chloride 106 100 - 111 mmol/L    CO2 26 21 - 32 mmol/L    Anion gap 5 3.0 - 18 mmol/L    Glucose 101 (H) 74 - 99 mg/dL    BUN 22 (H) 7.0 - 18 MG/DL    Creatinine 0.84 0.6 - 1.3 MG/DL    BUN/Creatinine ratio 26 (H) 12 - 20      GFR est AA >60 >60 ml/min/1.73m2    GFR est non-AA >60 >60 ml/min/1.73m2    Calcium 9.9 8.5 - 10.1 MG/DL    Bilirubin, total 0.4 0.2 - 1.0 MG/DL    ALT (SGPT) 37 16 - 61 U/L    AST (SGOT) 28 10 - 38 U/L    Alk. phosphatase 84 45 - 117 U/L    Protein, total 7.1 6.4 - 8.2 g/dL    Albumin 4.1 3.4 - 5.0 g/dL    Globulin 3.0 2.0 - 4.0 g/dL    A-G Ratio 1.4 0.8 - 1.7     PSA SCREENING (SCREENING)   Result Value Ref Range    Prostate Specific Ag 0.3 0.0 - 4.0 ng/mL     Assessment: #1. Hyperlipidemia doing very well, he will continue atorvastatin 40 mg daily and low-fat diet. #2.  GERD asymptomatic, he will continue pantoprazole 40 mg daily. #3.  Allergic rhinitis doing well, he will continue Flonase. #4.  Impaired fasting glucose improved over the last year. Continue to eat a healthy diet to keep his weight down. #5.  Osteoarthritis requiring occasional low-dose Norco.  That was refilled for him today. Follow-up 6 months with [facility] NESTOR Grider MD FACP    Please note: This document has been produced using voice recognition software. Unrecognized errors in transcription may be present.

## 2019-09-13 NOTE — PATIENT INSTRUCTIONS
Health Maintenance Due   Topic Date Due    AAA Screening 73-67 YO Male Smoking Patients  02/28/2015

## 2019-09-13 NOTE — PROGRESS NOTES
Chief Complaint   Patient presents with    Physical     ROOM  10    Labs     done 8-27-19    Medication Refill     Mail Order       1. Have you been to the ER, urgent care clinic since your last visit? Hospitalized since your last visit? No    2. Have you seen or consulted any other health care providers outside of the [address] [place] [address] since your last visit? Include any pap smears or colon screening. No    Patient was given a copy of the Advanced Directive and understands to bring it in once completed.   Health Maintenance Due   Topic Date Due    AAA Screening 73-67 YO Male Smoking Patients  02/28/2015

## 2020-01-27 ENCOUNTER — DOCUMENTATION ONLY (OUTPATIENT)
Dept: INTERNAL MEDICINE CLINIC | Age: 70
End: 2020-01-27

## 2020-01-27 NOTE — PROGRESS NOTES
Prior authirzation request has been approved for Protonix . The authorization is valid from 12/16/19 through 1/14/2021.

## 2020-02-03 ENCOUNTER — OFFICE VISIT (OUTPATIENT)
Dept: INTERNAL MEDICINE CLINIC | Age: 70
End: 2020-02-03

## 2020-02-03 VITALS
TEMPERATURE: 98.4 F | SYSTOLIC BLOOD PRESSURE: 148 MMHG | OXYGEN SATURATION: 95 % | BODY MASS INDEX: 27.1 KG/M2 | DIASTOLIC BLOOD PRESSURE: 80 MMHG | RESPIRATION RATE: 14 BRPM | HEIGHT: 71 IN | HEART RATE: 95 BPM | WEIGHT: 193.6 LBS

## 2020-02-03 DIAGNOSIS — J06.9 UPPER RESPIRATORY TRACT INFECTION, UNSPECIFIED TYPE: Primary | ICD-10-CM

## 2020-02-03 DIAGNOSIS — M15.9 PRIMARY OSTEOARTHRITIS INVOLVING MULTIPLE JOINTS: ICD-10-CM

## 2020-02-03 DIAGNOSIS — Z23 ENCOUNTER FOR IMMUNIZATION: ICD-10-CM

## 2020-02-03 RX ORDER — HYDROCODONE BITARTRATE AND ACETAMINOPHEN 5; 325 MG/1; MG/1
1 TABLET ORAL
Qty: 30 TAB | Refills: 0 | Status: SHIPPED | OUTPATIENT
Start: 2020-02-03 | End: 2020-03-04

## 2020-02-03 RX ORDER — HYDROCODONE BITARTRATE AND ACETAMINOPHEN 5; 325 MG/1; MG/1
TABLET ORAL
COMMUNITY
End: 2020-02-03 | Stop reason: SDUPTHER

## 2020-02-03 RX ORDER — AZITHROMYCIN 250 MG/1
TABLET, FILM COATED ORAL
Qty: 6 TAB | Refills: 0 | Status: SHIPPED | OUTPATIENT
Start: 2020-02-03 | End: 2020-02-08

## 2020-02-03 NOTE — PROGRESS NOTES
Jo Ann Nunez,born 1950, is a 71 y.o. male, who is seen today for evaluation of respiratory symptoms and apparent allergy symptoms. Right around Norbert time he walked outside through an area where people were vaping and immediately felt some irritation in his airways and coughing and scratchy throat but as he got into the uncontaminated air he felt better. Symptoms did not totally clear and he continued to have a sensation of irritation in his throat intermittent nasal congestion and intermittent coughing and then several days ago started coughing more and started bringing up yellow secretions. No dyspnea and no chills or fever. He tells me that sometimes when his exposed to certain odors it causes his lips to tingle and sometimes cough. Past Medical History:   Diagnosis Date    Allergic rhinitis     GERD (gastroesophageal reflux disease)     Hypercholesterolemia     Hyperlipidemia     Sarcoidosis      Current Outpatient Medications   Medication Sig Dispense Refill    HYDROcodone-acetaminophen (NORCO) 5-325 mg per tablet Take  by mouth.  pantoprazole (PROTONIX) 40 mg tablet Take 1 Tab by mouth daily. 90 Tab 3    atorvastatin (LIPITOR) 40 mg tablet Take one tablet by mouth each evening 90 Tab 3    fluticasone propionate (FLONASE) 50 mcg/actuation nasal spray 2 Sprays by Both Nostrils route daily. 3 Bottle 3    FOLIC ACID/MULTIVIT-MIN/LUTEIN (CENTRUM SILVER PO) Take  by mouth daily.  ASPIRIN PO Take 81 mg by mouth daily. Visit Vitals  /80 (BP 1 Location: Left arm, BP Patient Position: Sitting)   Pulse 95   Temp 98.4 °F (36.9 °C) (Oral)   Resp 14   Ht 5' 11\" (1.803 m)   Wt 193 lb 9.6 oz (87.8 kg)   SpO2 95%   BMI 27.00 kg/m²     Pharynx reveals no redness exudate or drainage. Nasal passages are clear. Neck reveals no adenopathy or tenderness. Sinuses are nontender.   Ear canals and tympanic membranes, normal on the left and almost total blockage with moist cerumen on the right, about 90% blockage and I cannot see the tympanic membrane on that side. Lungs are clear to percussion. Good breath sounds with no wheezing or crackles. With forced expiration there is no wheezing or coughing or prolongation of expiratory phase. Assessment: Symptoms of allergy as well as irritant symptoms, in the last few days upper respiratory infection. Will treat with azithromycin. I recommended he try to avoid strong odors and particularly cigarette smoking and vaping. He will call if symptoms persist or worsen. #2.  Blood pressure is a little high today, that will be rechecked at regular checkup in the near future. Giovanny Daniel MD FACP    Please note: This document has been produced using voice recognition software. Unrecognized errors in transcription may be present. This visit lasted 25 minutes, greater than 50% of the time was spent counseling on the above processes and recommendations, did recommend he continue to use Benadryl when needed and if symptoms substantially worsen further evaluation or if he is not breathing well he should be calling 911 to get to the emergency room.

## 2020-02-03 NOTE — PATIENT INSTRUCTIONS
Vaccine Information Statement Influenza (Flu) Vaccine (Inactivated or Recombinant): What You Need to Know Many Vaccine Information Statements are available in [place] and other languages. See www.immunize.org/vis Hojas de información sobre vacunas están disponibles en español y en muchos otros idiomas. Visite www.immunize.org/vis 1. Why get vaccinated? Influenza vaccine can prevent influenza (flu). Flu is a contagious disease that spreads around the [facility] every year, usually between October and May. Anyone can get the flu, but it is more dangerous for some people. Infants and young children, people 72years of age and older, pregnant women, and people with certain health conditions or a weakened immune system are at greatest risk of flu complications. Pneumonia, bronchitis, sinus infections and ear infections are examples of flu-related complications. If you have a medical condition, such as heart disease, cancer or diabetes, flu can make it worse. Flu can cause fever and chills, sore throat, muscle aches, fatigue, cough, headache, and runny or stuffy nose. Some people may have vomiting and diarrhea, though this is more common in children than adults. Each year thousands of people in the [facility] die from flu, and many more are hospitalized. Flu vaccine prevents millions of illnesses and flu-related visits to the doctor each year. 2. Influenza vaccines CDC recommends everyone 10months of age and older get vaccinated every flu season. Children 6 months through 6years of age may need 2 doses during a single flu season. Everyone else needs only 1 dose each flu season. It takes about 2 weeks for protection to develop after vaccination. There are many flu viruses, and they are always changing. Each year a new flu vaccine is made to protect against three or four viruses that are likely to cause disease in the upcoming flu season.  Even when the vaccine doesnt exactly match these viruses, it may still provide some protection. Influenza vaccine does not cause flu. Influenza vaccine may be given at the same time as other vaccines. 3. Talk with your health care provider Tell your vaccine provider if the person getting the vaccine: 
 Has had an allergic reaction after a previous dose of influenza vaccine, or has any severe, life-threatening allergies.  Has ever had Guillain-Barré Syndrome (also called GBS). In some cases, your health care provider may decide to postpone influenza vaccination to a future visit. People with minor illnesses, such as a cold, may be vaccinated. People who are moderately or severely ill should usually wait until they recover before getting influenza vaccine. Your health care provider can give you more information. 4. Risks of a reaction  Soreness, redness, and swelling where shot is given, fever, muscle aches, and headache can happen after influenza vaccine.  There may be a very small increased risk of Guillain-Barré Syndrome (GBS) after inactivated influenza vaccine (the flu shot). Sol upe children who get the flu shot along with pneumococcal vaccine (PCV13), and/or DTaP vaccine at the same time might be slightly more likely to have a seizure caused by fever. Tell your health care provider if a child who is getting flu vaccine has ever had a seizure. People sometimes faint after medical procedures, including vaccination. Tell your provider if you feel dizzy or have vision changes or ringing in the ears. As with any medicine, there is a very remote chance of a vaccine causing a severe allergic reaction, other serious injury, or death. 5. What if there is a serious problem? An allergic reaction could occur after the vaccinated person leaves the clinic.  If you see signs of a severe allergic reaction (hives, swelling of the face and throat, difficulty breathing, a fast heartbeat, dizziness, or weakness), call 9-1-1 and get the person to the nearest hospital. 
 
For other signs that concern you, call your health care provider. Adverse reactions should be reported to the Vaccine Adverse Event Reporting System (VAERS). Your health care provider will usually file this report, or you can do it yourself. Visit the VAERS website at www.vaers. hhs.gov or call 9-741.558.4735. VAERS is only for reporting reactions, and VAERS staff do not give medical advice. 6. The National Vaccine Injury Compensation Program 
 
The [facility] Vaccine Injury Compensation Program (VICP) is a federal program that was created to compensate people who may have been injured by certain vaccines. Visit the VICP website at www.hrsa.gov/vaccinecompensation or call 2-769.107.6880 to learn about the program and about filing a claim. There is a time limit to file a claim for compensation. 7. How can I learn more?  Ask your health care provider.  Call your local or state health department.  Contact the Centers for Disease Control and Prevention (CDC): 
- Call 1-615.804.6694 (4-838-ECH-INFO) or 
- Visit CDCs influenza website at www.cdc.gov/flu Vaccine Information Statement (Interim) Inactivated Influenza Vaccine 8/15/2019 
42 CHELLE High [id number]-[id number] Department of Health and [employer] Centers for Disease Control and Prevention Office Use Only

## 2020-03-05 ENCOUNTER — HOSPITAL ENCOUNTER (OUTPATIENT)
Dept: LAB | Age: 70
Discharge: HOME | End: 2020-03-05
Payer: COMMERCIAL

## 2020-03-05 DIAGNOSIS — E78.5 HYPERLIPIDEMIA LDL GOAL <100: ICD-10-CM

## 2020-03-05 LAB
ALBUMIN SERPL-MCNC: 3.9 G/DL (ref 3.4–5)
ALBUMIN/GLOB SERPL: 1.1 {RATIO} (ref 0.8–1.7)
ALP SERPL-CCNC: 87 U/L (ref 45–117)
ALT SERPL-CCNC: 40 U/L (ref 16–61)
ANION GAP SERPL CALC-SCNC: 7 MMOL/L (ref 3–18)
AST SERPL-CCNC: 26 U/L (ref 10–38)
BILIRUB SERPL-MCNC: 0.3 MG/DL (ref 0.2–1)
BUN SERPL-MCNC: 16 MG/DL (ref 7–18)
BUN/CREAT SERPL: 21 (ref 12–20)
CALCIUM SERPL-MCNC: 9.7 MG/DL (ref 8.5–10.1)
CHLORIDE SERPL-SCNC: 108 MMOL/L (ref 100–111)
CHOLEST SERPL-MCNC: 170 MG/DL
CO2 SERPL-SCNC: 28 MMOL/L (ref 21–32)
CREAT SERPL-MCNC: 0.75 MG/DL (ref 0.6–1.3)
GLOBULIN SER CALC-MCNC: 3.4 G/DL (ref 2–4)
GLUCOSE SERPL-MCNC: 110 MG/DL (ref 74–99)
HDLC SERPL-MCNC: 46 MG/DL (ref 40–60)
HDLC SERPL: 3.7 {RATIO} (ref 0–5)
LDLC SERPL CALC-MCNC: 105.4 MG/DL (ref 0–100)
LIPID PROFILE,FLP: ABNORMAL
POTASSIUM SERPL-SCNC: 4.6 MMOL/L (ref 3.5–5.5)
PROT SERPL-MCNC: 7.3 G/DL (ref 6.4–8.2)
SODIUM SERPL-SCNC: 143 MMOL/L (ref 136–145)
TRIGL SERPL-MCNC: 93 MG/DL (ref ?–150)
VLDLC SERPL CALC-MCNC: 18.6 MG/DL

## 2020-03-05 PROCEDURE — 80053 COMPREHEN METABOLIC PANEL: CPT

## 2020-03-05 PROCEDURE — 80061 LIPID PANEL: CPT

## 2020-03-05 PROCEDURE — 36415 COLL VENOUS BLD VENIPUNCTURE: CPT

## 2020-03-10 ENCOUNTER — TELEPHONE (OUTPATIENT)
Dept: INTERNAL MEDICINE CLINIC | Age: 70
End: 2020-03-10

## 2020-03-10 NOTE — TELEPHONE ENCOUNTER
Patient saw Dr. Huber Daniel on 2/3/20. Symptoms have not improved. Appt scheduled with Dr. Ata Mckeon 3/12/19. Travel screen negative.

## 2020-03-12 ENCOUNTER — OFFICE VISIT (OUTPATIENT)
Dept: INTERNAL MEDICINE CLINIC | Age: 70
End: 2020-03-12

## 2020-03-12 VITALS
OXYGEN SATURATION: 96 % | HEIGHT: 71 IN | RESPIRATION RATE: 14 BRPM | SYSTOLIC BLOOD PRESSURE: 146 MMHG | TEMPERATURE: 98.1 F | WEIGHT: 194 LBS | HEART RATE: 82 BPM | BODY MASS INDEX: 27.16 KG/M2 | DIASTOLIC BLOOD PRESSURE: 89 MMHG

## 2020-03-12 DIAGNOSIS — J01.10 ACUTE NON-RECURRENT FRONTAL SINUSITIS: Primary | ICD-10-CM

## 2020-03-12 RX ORDER — CEFDINIR 300 MG/1
300 CAPSULE ORAL 2 TIMES DAILY
Qty: 20 CAP | Refills: 0 | Status: SHIPPED | OUTPATIENT
Start: 2020-03-12 | End: 2020-03-22

## 2020-03-12 NOTE — PROGRESS NOTES
Leda Patterson presents today for   Chief Complaint   Patient presents with    Cough     cough, sore throat, chest congestion, headache x 3 weeks (travel screen negative) was given a zpack few weeks ago and sx's returned              Depression Screening:  3 most recent PHQ Screens 2/3/2020   Little interest or pleasure in doing things Not at all   Feeling down, depressed, irritable, or hopeless Not at all   Total Score PHQ 2 0       Learning Assessment:  Learning Assessment 9/13/2019   PRIMARY LEARNER Patient   HIGHEST LEVEL OF EDUCATION - PRIMARY LEARNER  2 YEARS Jhoan PRIMARY LEARNER NONE   CO-LEARNER CAREGIVER No   PRIMARY LANGUAGE ENGLISH   LEARNER PREFERENCE PRIMARY DEMONSTRATION     -   ANSWERED BY patient   RELATIONSHIP SELF       Abuse Screening:  Abuse Screening Questionnaire 9/13/2019   Do you ever feel afraid of your partner? N   Are you in a relationship with someone who physically or mentally threatens you? N   Is it safe for you to go home? Y       Fall Risk  Fall Risk Assessment, last 12 mths 2/3/2020   Able to walk? Yes   Fall in past 12 months? No       Health Maintenance Due   Topic Date Due    Shingrix Vaccine Age 49> (1 of 2) 02/28/2000    AAA Screening 73-67 YO Male Smoking Patients  02/28/2015    A1C test (Diabetic or Prediabetic)  08/22/2017         Coordination of Care:  1. Have you been to the ER, urgent care clinic since your last visit? Hospitalized since your last visit? no    2. Have you seen or consulted any other health care providers outside of the [address] [place] [address] since your last visit? Include any pap smears or colon screening.  no

## 2020-03-12 NOTE — PROGRESS NOTES
79 y.o. WHITE OR  male who presents for evaluation. He was treated by Dr. Rosina Lyons with Zithromax on 2/3/2020 for URI. Everything cleared up completely until the end of February when symptoms recurred. He does have allergies, remains on Flonase chronically. Symptoms are mostly in the sinuses with a lot of congestion, facial pressure, mostly clear material, tooth pain. He now has developed slight postnasal drip, minimally productive cough, no wheezing or dyspnea. He knows of no exposures, he has not had a fever, generalized achiness, GI complaints. Past Medical History:   Diagnosis Date    Allergic rhinitis     GERD (gastroesophageal reflux disease)     Hyperlipidemia     Impaired fasting glucose 2/12/2013    Sarcoidosis      Current Outpatient Medications   Medication Sig    pantoprazole (PROTONIX) 40 mg tablet Take 1 Tab by mouth daily.  atorvastatin (LIPITOR) 40 mg tablet Take one tablet by mouth each evening    fluticasone propionate (FLONASE) 50 mcg/actuation nasal spray 2 Sprays by Both Nostrils route daily.  FOLIC ACID/MULTIVIT-MIN/LUTEIN (CENTRUM SILVER PO) Take  by mouth daily.  ASPIRIN PO Take 81 mg by mouth daily. No current facility-administered medications for this visit. Allergies   Allergen Reactions    Augmentin [Amoxicillin-Pot Clavulanate] Rash    Medrol [Methylprednisolone] Unknown (comments)     nervous    Sulfa (Sulfonamide Antibiotics) Unknown (comments)     Visit Vitals  /89   Pulse 82   Temp 98.1 °F (36.7 °C) (Oral)   Resp 14   Ht 5' 11\" (1.803 m)   Wt 194 lb (88 kg)   SpO2 96%   BMI 27.06 kg/m²   Tympanic membranes normal, sinuses tender to percussion with fairly boggy mucosa noted, oropharynx otherwise benign, no adenopathy. Lungs are clear with good breath sounds. Heart showed regular rate rhythm. Abdomen soft and nontender    Assessment and plan:  1. Sinusitis.   I gave him Omnicef, suggested he use antihistamine decongestant combo, stay on Flonase, he has not had good success with Neti pot in the past.  Call if worsening complaints        Above conditions discussed at length and patient vocalized understanding.   All questions answered to patient satisfaction

## 2020-03-19 ENCOUNTER — OFFICE VISIT (OUTPATIENT)
Dept: INTERNAL MEDICINE CLINIC | Age: 70
End: 2020-03-19

## 2020-03-19 VITALS
HEART RATE: 79 BPM | WEIGHT: 192.6 LBS | DIASTOLIC BLOOD PRESSURE: 74 MMHG | BODY MASS INDEX: 26.96 KG/M2 | RESPIRATION RATE: 12 BRPM | TEMPERATURE: 97.9 F | OXYGEN SATURATION: 96 % | HEIGHT: 71 IN | SYSTOLIC BLOOD PRESSURE: 132 MMHG

## 2020-03-19 DIAGNOSIS — Z12.5 PROSTATE CANCER SCREENING: ICD-10-CM

## 2020-03-19 DIAGNOSIS — Z00.00 ROUTINE GENERAL MEDICAL EXAMINATION AT A HEALTH CARE FACILITY: ICD-10-CM

## 2020-03-19 DIAGNOSIS — J30.1 CHRONIC ALLERGIC RHINITIS DUE TO POLLEN: ICD-10-CM

## 2020-03-19 DIAGNOSIS — E78.5 HYPERLIPIDEMIA LDL GOAL <100: Primary | ICD-10-CM

## 2020-03-19 DIAGNOSIS — K21.9 GASTROESOPHAGEAL REFLUX DISEASE WITHOUT ESOPHAGITIS: ICD-10-CM

## 2020-03-19 DIAGNOSIS — R73.01 IMPAIRED FASTING GLUCOSE: ICD-10-CM

## 2020-03-19 NOTE — PROGRESS NOTES
Julio Nunez,born 1950, is a 79 y.o. male, who is seen today for reevaluation of hyperlipidemia allergic rhinitis GERD and recent respiratory infection. He is almost finished Omnicef and his cough and other symptoms are much better. He takes his medicine correctly. He is having no reflux as he continues pantoprazole. 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 Past Medical History:   Diagnosis Date    Allergic rhinitis     GERD (gastroesophageal reflux disease)     Hyperlipidemia     Impaired fasting glucose 2/12/2013    Sarcoidosis      Current Outpatient Medications   Medication Sig Dispense Refill    cefdinir (OMNICEF) 300 mg capsule Take 1 Cap by mouth two (2) times a day for 10 days. 20 Cap 0    pantoprazole (PROTONIX) 40 mg tablet Take 1 Tab by mouth daily. 90 Tab 3    atorvastatin (LIPITOR) 40 mg tablet Take one tablet by mouth each evening 90 Tab 3    fluticasone propionate (FLONASE) 50 mcg/actuation nasal spray 2 Sprays by Both Nostrils route daily. 3 Bottle 3    FOLIC ACID/MULTIVIT-MIN/LUTEIN (CENTRUM SILVER PO) Take  by mouth daily.  ASPIRIN PO Take 81 mg by mouth daily. Visit Vitals  /74 (BP 1 Location: Left arm, BP Patient Position: Sitting)   Pulse 79   Temp 97.9 °F (36.6 °C) (Oral)   Resp 12   Ht 5' 11\" (1.803 m)   Wt 192 lb 9.6 oz (87.4 kg)   SpO2 96%   BMI 26.86 kg/m²     Carotids are 2+ without bruits. No adenopathy or thyromegaly. Lungs are clear to percussion. Good breath sounds with no wheezing or crackles. Heart reveals a regular rhythm with normal S1 and S2 no murmur gallop click or rub. Apical impulse is not palpable. Abdomen is soft and nontender with no hepatosplenomegaly or masses and no bruits. Extremities reveal no clubbing cyanosis or edema. Pulses are intact.     Results for orders placed or performed during the hospital encounter of 03/05/20   LIPID PANEL   Result Value Ref Range LIPID PROFILE          Cholesterol, total 170 <200 MG/DL    Triglyceride 93 <150 MG/DL    HDL Cholesterol 46 40 - 60 MG/DL    LDL, calculated 105.4 (H) 0 - 100 MG/DL    VLDL, calculated 18.6 MG/DL    CHOL/HDL Ratio 3.7 0 - 5.0     METABOLIC PANEL, COMPREHENSIVE   Result Value Ref Range    Sodium 143 136 - 145 mmol/L    Potassium 4.6 3.5 - 5.5 mmol/L    Chloride 108 100 - 111 mmol/L    CO2 28 21 - 32 mmol/L    Anion gap 7 3.0 - 18 mmol/L    Glucose 110 (H) 74 - 99 mg/dL    BUN 16 7.0 - 18 MG/DL    Creatinine 0.75 0.6 - 1.3 MG/DL    BUN/Creatinine ratio 21 (H) 12 - 20      GFR est AA >60 >60 ml/min/1.73m2    GFR est non-AA >60 >60 ml/min/1.73m2    Calcium 9.7 8.5 - 10.1 MG/DL    Bilirubin, total 0.3 0.2 - 1.0 MG/DL    ALT (SGPT) 40 16 - 61 U/L    AST (SGOT) 26 10 - 38 U/L    Alk. phosphatase 87 45 - 117 U/L    Protein, total 7.3 6.4 - 8.2 g/dL    Albumin 3.9 3.4 - 5.0 g/dL    Globulin 3.4 2.0 - 4.0 g/dL    A-G Ratio 1.1 0.8 - 1.7       Assessment: #1. Hyperlipidemia doing quite well, he will continue low-fat diet, avoid weight gain and continue atorvastatin 40 mg daily. #2.  GERD asymptomatic, he will continue pantoprazole 40 mg daily. #3.  Allergic rhinitis, he is doing well, he will continue Flonase. #4.  Upper respiratory infection nearly resolved, he will finish out the last couple of days of cefdinir. Follow-up in 6 months for complete evaluation/physical with Dr. Sanjuana Monahan. Yesenia Raines MD FACP    Please note: This document has been produced using voice recognition software. Unrecognized errors in transcription may be present.

## 2020-09-11 ENCOUNTER — TELEPHONE (OUTPATIENT)
Dept: INTERNAL MEDICINE CLINIC | Age: 70
End: 2020-09-11

## 2020-09-11 NOTE — TELEPHONE ENCOUNTER
141 Pending sale to [facility]- pt needs to r/s his appt with RD on 09/25/20  Offer 10/05/20- 8:40  appt time has been blocked ok to use

## 2020-09-28 ENCOUNTER — TELEPHONE (OUTPATIENT)
Dept: INTERNAL MEDICINE CLINIC | Age: 70
End: 2020-09-28

## 2020-09-28 NOTE — TELEPHONE ENCOUNTER
----- Message from Radha Rodriguez sent at 9/28/2020  2:12 PM EDT -----  Regarding: labs and physical  Patient called stating he needs to reschedule has lab apt and physical something to come up

## 2020-10-06 ENCOUNTER — HOSPITAL ENCOUNTER (OUTPATIENT)
Dept: LAB | Age: 70
Discharge: HOME | End: 2020-10-06
Payer: COMMERCIAL

## 2020-10-06 ENCOUNTER — LAB ONLY (OUTPATIENT)
Dept: INTERNAL MEDICINE CLINIC | Age: 70
End: 2020-10-06

## 2020-10-06 DIAGNOSIS — E78.5 HYPERLIPIDEMIA LDL GOAL <100: ICD-10-CM

## 2020-10-06 DIAGNOSIS — R73.01 IMPAIRED FASTING GLUCOSE: ICD-10-CM

## 2020-10-06 DIAGNOSIS — E78.5 HYPERLIPIDEMIA LDL GOAL <100: Primary | ICD-10-CM

## 2020-10-06 DIAGNOSIS — Z12.9 SCREENING FOR MALIGNANT NEOPLASM: ICD-10-CM

## 2020-10-06 DIAGNOSIS — K21.9 GASTROESOPHAGEAL REFLUX DISEASE WITHOUT ESOPHAGITIS: ICD-10-CM

## 2020-10-06 DIAGNOSIS — Z12.5 SPECIAL SCREENING FOR MALIGNANT NEOPLASM OF PROSTATE: ICD-10-CM

## 2020-10-06 LAB
ALBUMIN SERPL-MCNC: 3.7 G/DL (ref 3.4–5)
ALBUMIN/GLOB SERPL: 1.1 {RATIO} (ref 0.8–1.7)
ALP SERPL-CCNC: 75 U/L (ref 45–117)
ALT SERPL-CCNC: 41 U/L (ref 16–61)
ANION GAP SERPL CALC-SCNC: 8 MMOL/L (ref 3–18)
AST SERPL-CCNC: 23 U/L (ref 10–38)
BASOPHILS # BLD: 0 K/UL (ref 0–0.06)
BASOPHILS NFR BLD: 0 % (ref 0–3)
BILIRUB SERPL-MCNC: 0.4 MG/DL (ref 0.2–1)
BUN SERPL-MCNC: 21 MG/DL (ref 7–18)
BUN/CREAT SERPL: 27 (ref 12–20)
CALCIUM SERPL-MCNC: 9.2 MG/DL (ref 8.5–10.1)
CHLORIDE SERPL-SCNC: 108 MMOL/L (ref 100–111)
CHOLEST SERPL-MCNC: 160 MG/DL
CO2 SERPL-SCNC: 26 MMOL/L (ref 21–32)
CREAT SERPL-MCNC: 0.79 MG/DL (ref 0.6–1.3)
DIFFERENTIAL METHOD BLD: ABNORMAL
EOSINOPHIL # BLD: 0.2 K/UL (ref 0–0.4)
EOSINOPHIL NFR BLD: 4 % (ref 0–5)
ERYTHROCYTE [DISTWIDTH] IN BLOOD BY AUTOMATED COUNT: 14.7 % (ref 11.6–14.5)
EST. AVERAGE GLUCOSE BLD GHB EST-MCNC: 137 MG/DL
GLOBULIN SER CALC-MCNC: 3.4 G/DL (ref 2–4)
GLUCOSE SERPL-MCNC: 110 MG/DL (ref 74–99)
HBA1C MFR BLD: 6.4 % (ref 4.2–5.6)
HCT VFR BLD AUTO: 42.2 % (ref 36–48)
HDLC SERPL-MCNC: 41 MG/DL (ref 40–60)
HDLC SERPL: 3.9 {RATIO} (ref 0–5)
HGB BLD-MCNC: 14.1 G/DL (ref 13–16)
LDLC SERPL CALC-MCNC: 91.4 MG/DL (ref 0–100)
LIPID PROFILE,FLP: NORMAL
LYMPHOCYTES # BLD: 1.2 K/UL (ref 0.8–3.5)
LYMPHOCYTES NFR BLD: 30 % (ref 20–51)
MCH RBC QN AUTO: 28.3 PG (ref 24–34)
MCHC RBC AUTO-ENTMCNC: 33.4 G/DL (ref 31–37)
MCV RBC AUTO: 84.7 FL (ref 74–97)
MONOCYTES # BLD: 0.2 K/UL (ref 0–1)
MONOCYTES NFR BLD: 5 % (ref 2–9)
NEUTS SEG # BLD: 2.4 K/UL (ref 1.8–8)
NEUTS SEG NFR BLD: 61 % (ref 42–75)
PLATELET # BLD AUTO: 215 K/UL (ref 135–420)
PLATELET COMMENTS,PCOM: ABNORMAL
POTASSIUM SERPL-SCNC: 4.4 MMOL/L (ref 3.5–5.5)
PROT SERPL-MCNC: 7.1 G/DL (ref 6.4–8.2)
PSA SERPL-MCNC: 0.3 NG/ML (ref 0–4)
RBC # BLD AUTO: 4.98 M/UL (ref 4.7–5.5)
RBC MORPH BLD: ABNORMAL
SODIUM SERPL-SCNC: 142 MMOL/L (ref 136–145)
TRIGL SERPL-MCNC: 138 MG/DL (ref ?–150)
VLDLC SERPL CALC-MCNC: 27.6 MG/DL
WBC # BLD AUTO: 4 K/UL (ref 4.6–13.2)

## 2020-10-06 PROCEDURE — 36415 COLL VENOUS BLD VENIPUNCTURE: CPT

## 2020-10-06 PROCEDURE — 83036 HEMOGLOBIN GLYCOSYLATED A1C: CPT

## 2020-10-06 PROCEDURE — 84153 ASSAY OF PSA TOTAL: CPT

## 2020-10-06 PROCEDURE — 85025 COMPLETE CBC W/AUTO DIFF WBC: CPT

## 2020-10-06 PROCEDURE — 80053 COMPREHEN METABOLIC PANEL: CPT

## 2020-10-06 PROCEDURE — 80061 LIPID PANEL: CPT

## 2020-10-12 PROBLEM — E66.3 OVERWEIGHT WITH BODY MASS INDEX (BMI) OF 25 TO 25.9 IN ADULT: Status: ACTIVE | Noted: 2020-10-12

## 2020-10-12 PROBLEM — J30.9 ALLERGIC RHINITIS: Status: ACTIVE | Noted: 2018-03-08

## 2020-10-13 NOTE — PROGRESS NOTES
79 y.o. WHITE OR  male who presents for evaluation. He is transferring care from Dr Sloane Delgado. At least 1 hour was spent reviewing the EMR and reviewing all available records    No cardiovascular complaints. No set exercise but he tries to remain active. 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 No sob, wheezing, chronic cough. Has not had lung ca or AAA screening. The sarcoidosis has not been active    He is maintained on ppi but has occasional breakthrough with spicy foods,  No alarm complaints, uses antacids periodically    No gu issues    Interestingly, he is not aware of being overtly diabetic. Currently managed with diet alone. Denies polyuria, polydipsia, nocturia, vision change. Not checking sugars at this time. He has not gone for teaching but interested in going, not interested in monitoring despite discussion.  He sees ophth yearly and no known disease, denied neuropathic sx    Uses the norco prn for the right shoulder when he overdoes things    Past Medical History:   Diagnosis Date    Allergic rhinitis     Diabetes mellitus ([facility] [address] 2014    on basis of hba1c    Dyslipidemia     GERD (gastroesophageal reflux disease)     Hearing loss 1996    LEFT; Dr Rachell Jarvis s/p tympanoplasty, atticotomy, temporofascial graft for cholesteotoma    History of left heart catheterization 1998    negative Dr Vilma Stewart for false positive stress test    Overweight    Gracia Fuccata [place] on bx    Vocal cord paralysis 12/2004    RIGHT     Past Surgical History:   Procedure Laterality Date    HX CATARACT REMOVAL  2009    Dr Uriel Oliveira 6/11/12 neg    HX HEENT      LEFT ear surgery Dr Rachell Jarvis 5/96; LEFT ear tympanoplasty Dr Med [place] 5/06    [address] [employer] repair    HX ORTHOPAEDIC      RIGHT rotator cuff repair 6/20 Dr Taina Ya; redo 7/20; redo 12/02 Dr Crow Avila ORTHOPAEDIC      RIGHT CTS , LEFT  Dr Shagufta Murcia TONSILLECTOMY  1981     Social History     Socioeconomic History    Marital status:      Spouse name: Not on file    Number of children: Not on file    Years of education: Not on file    Highest education level: Not on file   Occupational History    Not on file   Social Needs    Financial resource strain: Not on file    Food insecurity     Worry: Not on file     Inability: Not on file    Transportation needs     Medical: Not on file     Non-medical: Not on file   Tobacco Use    Smoking status: Former Smoker     Packs/day: 5.00     Years: 15.00     Pack years: 75.00     Types: Cigarettes, Pipe, Cigars     Last attempt to quit: 1980     Years since quittin.8    Smokeless tobacco: Never Used   Substance and Sexual Activity    Alcohol use: No    Drug use: No    Sexual activity: Not on file   Lifestyle    Physical activity     Days per week: Not on file     Minutes per session: Not on file    Stress: Not on file   Relationships    Social connections     Talks on phone: Not on file     Gets together: Not on file     Attends Sabianist service: Not on file     Active member of club or organization: Not on file     Attends meetings of clubs or organizations: Not on file     Relationship status: Not on file    Intimate partner violence     Fear of current or ex partner: Not on file     Emotionally abused: Not on file     Physically abused: Not on file     Forced sexual activity: Not on file   Other Topics Concern    Not on file   Social History Narrative    Not on file     Current Outpatient Medications   Medication Sig    HYDROcodone-acetaminophen ([address]) 5-325 mg per tablet Take  by mouth every six (6) hours as needed for Pain.  pantoprazole (PROTONIX) 40 mg tablet Take 1 Tab by mouth daily.     atorvastatin (LIPITOR) 40 mg tablet Take one tablet by mouth each evening    fluticasone propionate (FLONASE) 50 mcg/actuation nasal spray 2 Sprays by Both Nostrils route daily.  FOLIC ACID/MULTIVIT-MIN/LUTEIN (CENTRUM SILVER PO) Take  by mouth daily.  ASPIRIN PO Take 81 mg by mouth daily. No current facility-administered medications for this visit. Allergies   Allergen Reactions    Augmentin [Amoxicillin-Pot Clavulanate] Rash    Medrol [Methylprednisolone] Unknown (comments)     nervous    Sulfa (Sulfonamide Antibiotics) Unknown (comments)     REVIEW OF SYSTEMS: colo 6/12  Ophtho  no vision change or eye pain  Oral  no mouth pain, tongue or tooth problems  Ears  no hearing loss, ear pain, fullness, no swallowing problems  Cardiac  no CP, PND, orthopnea, edema, palpitations or syncope  Chest  no breast masses  Resp  no wheezing, chronic coughing, dyspnea  GI  no heartburn, nausea, vomiting, change in bowel habits, bleeding, hemorrhoids  Urinary  no dysuria, hematuria, flank pain, urgency, frequency  Genitals  no genital lesions, discharge, masses, ulceration, warts  Ortho  no swelling, dec ROM, myalgias  Derm  no nail abnormalities, rashes, lesions of note, hair loss  Psych  denies any anxiety or depression symptoms, no hallucinations or violent ideation  Constitutional  no wt loss, night sweats, unexplained fevers  Neuro  no focal weakness, numbness, paresthesias, incoordination, ataxia, involuntary movements  Endo - no polyuria, polydipsia, nocturia, hot flashes    Visit Vitals  /84   Pulse 79   Temp 97.5 °F (36.4 °C) (Temporal)   Resp 14   Ht 5' 11\" (1.803 m)   Wt 193 lb (87.5 kg)   SpO2 95%   BMI 26.92 kg/m²     A&O x3  Affect is appropriate. Mood stable  No apparent distress  Anicteric, no JVD, adenopathy or thyromegaly. No carotid bruits or radiated murmur  Lungs clear to auscultation, no wheezes or rales  Heart showed regular rate and rhythm. No murmur, rubs, gallops  Abdomen soft nontender, no hepatosplenomegaly or masses.    Rectal showed guaiac neg brown stool normal tone  Prostate about 35 gm without asymmetry, nodularity  Extremities without edema.   Pulses 1-2+ symmetrically  Foot exam bilaterally showed  Reflexes 2+     LABS  From 10/11 showed          chol 230, tg 153, hdl 36, ldl-c 163  From 2/14 showed   gluc 126, cr 0.97, gfr>60,    hba1c 6.6, umar 5.9,  chol 175, tg 196, hdl 33, ldl-c 103, wbc 4.9, hb 14.9, plt 197, tsh 2.35, psa 0.50  From 2/15 showed   gluc 106, cr 1.00, gfr>60, alt 21, hba1c 6.8,    chol 146, tg 98,   hdl 38, ldl-c 88  From 8/15 showed   gluc 109, cr 0.87, gfr>60, alt 21, hba1c 6.1,    chol 174, tg 120, hdl 39, ldl-c 111  From 8/16 showed   gluc 101, cr 0.73, gfr>60, alt 38, hba1c 6.1,   chol 134, tg 152, hdl 39, ldl-c 65,   wbc 3.7, hb 13.7, plt 196, tsh 1.40, ua neg  From 8/17 showed   gluc 97,   cr 0.81, gfr>60, alt 39,     chol 157, tg 184, hdl 39, ldl-c 81,   wbc 4.2, hb 13.9, plt 219,       psa 0.3  From 3/18 showed   gluc 107, cr 0.72, gfr>60, alt 38,     chol 155, tg 128, hdl 41, ldl-c 88  From 9/18 showed   gluc 114, cr 0.71, gfr>60, alt 39,     chol 145, tg 90,   hdl 43, ldl-c 84,   wbc 3.6, hb 13.8, plt 193  From 3/19 showed   gluc 107, cr 0.76, gfr>60, alt 39,     chol 179, tg 165, hdl 43, ldl-c 103,             psa 0.3  From 8/19 showed   gluc 101, cr 0.84, gfr>60, alt 37,     chol 152, tg 92,   hdl 42, ldl-c 92,   wbc 4.7, hb 13.5, plt 207,      psa 0.3  From 3/20 showed   gluc 110, cr 0.76, gfr>60, alt 40,     chol 170, tg 93,   hdl 46, ldl-c 105    Results for orders placed or performed during the hospital encounter of 24/23/42   METABOLIC PANEL, COMPREHENSIVE   Result Value Ref Range    Sodium 142 136 - 145 mmol/L    Potassium 4.4 3.5 - 5.5 mmol/L    Chloride 108 100 - 111 mmol/L    CO2 26 21 - 32 mmol/L    Anion gap 8 3.0 - 18 mmol/L    Glucose 110 (H) 74 - 99 mg/dL    BUN 21 (H) 7.0 - 18 MG/DL    Creatinine 0.79 0.6 - 1.3 MG/DL    BUN/Creatinine ratio 27 (H) 12 - 20      GFR est AA >60 >60 ml/min/1.73m2    GFR est non-AA >60 >60 ml/min/1.73m2    Calcium 9.2 8.5 - 10.1 MG/DL    Bilirubin, total 0.4 0.2 - 1.0 MG/DL    ALT (SGPT) 41 16 - 61 U/L    AST (SGOT) 23 10 - 38 U/L    Alk. phosphatase 75 45 - 117 U/L    Protein, total 7.1 6.4 - 8.2 g/dL    Albumin 3.7 3.4 - 5.0 g/dL    Globulin 3.4 2.0 - 4.0 g/dL    A-G Ratio 1.1 0.8 - 1.7     HEMOGLOBIN A1C WITH EAG   Result Value Ref Range    Hemoglobin A1c 6.4 (H) 4.2 - 5.6 %    Est. average glucose 137 mg/dL   LIPID PANEL   Result Value Ref Range    LIPID PROFILE          Cholesterol, total 160 <200 MG/DL    Triglyceride 138 <150 MG/DL    HDL Cholesterol 41 40 - 60 MG/DL    LDL, calculated 91.4 0 - 100 MG/DL    VLDL, calculated 27.6 MG/DL    CHOL/HDL Ratio 3.9 0 - 5.0     CBC WITH AUTOMATED DIFF   Result Value Ref Range    WBC 4.0 (L) 4.6 - 13.2 K/uL    RBC 4.98 4.70 - 5.50 M/uL    HGB 14.1 13.0 - 16.0 g/dL    HCT 42.2 36.0 - 48.0 %    MCV 84.7 74.0 - 97.0 FL    MCH 28.3 24.0 - 34.0 PG    MCHC 33.4 31.0 - 37.0 g/dL    RDW 14.7 (H) 11.6 - 14.5 %    PLATELET 024 978 - 875 K/uL    NEUTROPHILS 61 42 - 75 %    LYMPHOCYTES 30 20 - 51 %    MONOCYTES 5 2 - 9 %    EOSINOPHILS 4 0 - 5 %    BASOPHILS 0 0 - 3 %    ABS. NEUTROPHILS 2.4 1.8 - 8.0 K/UL    ABS. LYMPHOCYTES 1.2 0.8 - 3.5 K/UL    ABS. MONOCYTES 0.2 0 - 1.0 K/UL    ABS. EOSINOPHILS 0.2 0.0 - 0.4 K/UL    ABS. BASOPHILS 0.0 0.0 - 0.06 K/UL    DF MANUAL      PLATELET COMMENTS ADEQUATE PLATELETS      RBC COMMENTS NORMOCYTIC, NORMOCHROMIC     PSA SCREENING (SCREENING)   Result Value Ref Range    Prostate Specific Ag 0.3 0.0 - 4.0 ng/mL     We reviewed the patient's labs from the last several visits to point out trends in the numbers      Patient Active Problem List   Diagnosis Code    Sarcoidosis D86.9    [facility]    Dyslipidemia E78.5    GERD (gastroesophageal reflux disease) K21.9    Allergic rhinitis J30.9    Overweight with body mass index (BMI) of 25 to 25.9 in adult E66.3, Z68.25     Assessment and plan:  1. Sarcoidosis. Inactive  2.  DM. VERY long discussion. Declined metformin, will send for diabetic teaching. F/u ophth  3. Dyslipidemia. Continue current regimen. 4. GERD. PPI and avoidance measures  5. Allergies. Continue current regimen. 6. Lung ca and AAA screening discussed at length. He will check with insurance for coverage  7. Flu given,  Advocated shingrix      RTC 4/21      Above conditions discussed at length and patient vocalized understanding.   All questions answered to patient satisfaction      TOTAL time 40 min spent of which at least 30 min was on counseling, answering questions and coordination of care

## 2020-10-14 ENCOUNTER — OFFICE VISIT (OUTPATIENT)
Dept: INTERNAL MEDICINE CLINIC | Age: 70
End: 2020-10-14
Payer: COMMERCIAL

## 2020-10-14 VITALS
SYSTOLIC BLOOD PRESSURE: 138 MMHG | DIASTOLIC BLOOD PRESSURE: 84 MMHG | HEIGHT: 71 IN | RESPIRATION RATE: 14 BRPM | OXYGEN SATURATION: 95 % | BODY MASS INDEX: 27.02 KG/M2 | TEMPERATURE: 97.5 F | HEART RATE: 79 BPM | WEIGHT: 193 LBS

## 2020-10-14 DIAGNOSIS — E66.3 OVERWEIGHT WITH BODY MASS INDEX (BMI) OF 25 TO 25.9 IN ADULT: ICD-10-CM

## 2020-10-14 DIAGNOSIS — E78.5 DYSLIPIDEMIA: ICD-10-CM

## 2020-10-14 DIAGNOSIS — D86.9 SARCOIDOSIS: ICD-10-CM

## 2020-10-14 DIAGNOSIS — K21.9 GASTROESOPHAGEAL REFLUX DISEASE WITHOUT ESOPHAGITIS: ICD-10-CM

## 2020-10-14 DIAGNOSIS — Z00.00 PHYSICAL EXAM: Primary | ICD-10-CM

## 2020-10-14 DIAGNOSIS — Z23 NEEDS FLU SHOT: ICD-10-CM

## 2020-10-14 DIAGNOSIS — E11.9 TYPE 2 DIABETES MELLITUS WITHOUT COMPLICATION, WITHOUT LONG-TERM CURRENT USE OF INSULIN (HCC): ICD-10-CM

## 2020-10-14 PROCEDURE — 90694 VACC AIIV4 NO PRSRV 0.5ML IM: CPT | Performed by: INTERNAL MEDICINE

## 2020-10-14 PROCEDURE — 1101F PT FALLS ASSESS-DOCD LE1/YR: CPT | Performed by: INTERNAL MEDICINE

## 2020-10-14 PROCEDURE — 3017F COLORECTAL CA SCREEN DOC REV: CPT | Performed by: INTERNAL MEDICINE

## 2020-10-14 PROCEDURE — G8419 CALC BMI OUT NRM PARAM NOF/U: HCPCS | Performed by: INTERNAL MEDICINE

## 2020-10-14 PROCEDURE — G8536 NO DOC ELDER MAL SCRN: HCPCS | Performed by: INTERNAL MEDICINE

## 2020-10-14 PROCEDURE — 3044F HG A1C LEVEL LT 7.0%: CPT | Performed by: INTERNAL MEDICINE

## 2020-10-14 PROCEDURE — G8510 SCR DEP NEG, NO PLAN REQD: HCPCS | Performed by: INTERNAL MEDICINE

## 2020-10-14 PROCEDURE — 99215 OFFICE O/P EST HI 40 MIN: CPT | Performed by: INTERNAL MEDICINE

## 2020-10-14 PROCEDURE — 90471 IMMUNIZATION ADMIN: CPT | Performed by: INTERNAL MEDICINE

## 2020-10-14 PROCEDURE — 2022F DILAT RTA XM EVC RTNOPTHY: CPT | Performed by: INTERNAL MEDICINE

## 2020-10-14 PROCEDURE — G8427 DOCREV CUR MEDS BY ELIG CLIN: HCPCS | Performed by: INTERNAL MEDICINE

## 2020-10-14 PROCEDURE — 82272 OCCULT BLD FECES 1-3 TESTS: CPT | Performed by: INTERNAL MEDICINE

## 2020-10-14 RX ORDER — HYDROCODONE BITARTRATE AND ACETAMINOPHEN 5; 325 MG/1; MG/1
TABLET ORAL
COMMUNITY
End: 2021-05-11 | Stop reason: SDUPTHER

## 2020-10-14 NOTE — ACP (ADVANCE CARE PLANNING)
Advance Care Plan or Surrogate Decision Maker documented in medical record.      PT wants lani Goodwin to be POA  Full code

## 2020-10-14 NOTE — PATIENT INSTRUCTIONS
Vaccine Information Statement Influenza (Flu) Vaccine (Inactivated or Recombinant): What You Need to Know Many Vaccine Information Statements are available in [place] and other languages. See www.immunize.org/vis Hojas de información sobre vacunas están disponibles en español y en muchos otros idiomas. Visite www.immunize.org/vis 1. Why get vaccinated? Influenza vaccine can prevent influenza (flu). Flu is a contagious disease that spreads around the [facility] every year, usually between October and May. Anyone can get the flu, but it is more dangerous for some people. Infants and young children, people 72years of age and older, pregnant women, and people with certain health conditions or a weakened immune system are at greatest risk of flu complications. Pneumonia, bronchitis, sinus infections and ear infections are examples of flu-related complications. If you have a medical condition, such as heart disease, cancer or diabetes, flu can make it worse. Flu can cause fever and chills, sore throat, muscle aches, fatigue, cough, headache, and runny or stuffy nose. Some people may have vomiting and diarrhea, though this is more common in children than adults. Each year thousands of people in the [employer] die from flu, and many more are hospitalized. Flu vaccine prevents millions of illnesses and flu-related visits to the doctor each year. 2. Influenza vaccines CDC recommends everyone 10months of age and older get vaccinated every flu season. Children 6 months through 6years of age may need 2 doses during a single flu season. Everyone else needs only 1 dose each flu season. It takes about 2 weeks for protection to develop after vaccination. There are many flu viruses, and they are always changing. Each year a new flu vaccine is made to protect against three or four viruses that are likely to cause disease in the upcoming flu season.  Even when the vaccine doesnt exactly match these viruses, it may still provide some protection. Influenza vaccine does not cause flu. Influenza vaccine may be given at the same time as other vaccines. 3. Talk with your health care provider Tell your vaccine provider if the person getting the vaccine: 
 Has had an allergic reaction after a previous dose of influenza vaccine, or has any severe, life-threatening allergies.  Has ever had Guillain-Barré Syndrome (also called GBS). In some cases, your health care provider may decide to postpone influenza vaccination to a future visit. People with minor illnesses, such as a cold, may be vaccinated. People who are moderately or severely ill should usually wait until they recover before getting influenza vaccine. Your health care provider can give you more information. 4. Risks of a reaction  Soreness, redness, and swelling where shot is given, fever, muscle aches, and headache can happen after influenza vaccine.  There may be a very small increased risk of Guillain-Barré Syndrome (GBS) after inactivated influenza vaccine (the flu shot). [place] children who get the flu shot along with pneumococcal vaccine (PCV13), and/or DTaP vaccine at the same time might be slightly more likely to have a seizure caused by fever. Tell your health care provider if a child who is getting flu vaccine has ever had a seizure. People sometimes faint after medical procedures, including vaccination. Tell your provider if you feel dizzy or have vision changes or ringing in the ears. As with any medicine, there is a very remote chance of a vaccine causing a severe allergic reaction, other serious injury, or death. 5. What if there is a serious problem? An allergic reaction could occur after the vaccinated person leaves the clinic.  If you see signs of a severe allergic reaction (hives, swelling of the face and throat, difficulty breathing, a fast heartbeat, dizziness, or weakness), call 9-1-1 and get the person to the nearest hospital. 
 
For other signs that concern you, call your health care provider. Adverse reactions should be reported to the Vaccine Adverse Event Reporting System (VAERS). Your health care provider will usually file this report, or you can do it yourself. Visit the VAERS website at www.vaers. hhs.gov or call 4-192.790.7905. VAERS is only for reporting reactions, and VAERS staff do not give medical advice. 6. The National Vaccine Injury Compensation Program 
 
The [facility] Vaccine Injury Compensation Program (VICP) is a federal program that was created to compensate people who may have been injured by certain vaccines. Visit the VICP website at www.hrsa.gov/vaccinecompensation or call 2-278.260.3396 to learn about the program and about filing a claim. There is a time limit to file a claim for compensation. 7. How can I learn more?  Ask your health care provider.  Call your local or state health department.  Contact the Centers for Disease Control and Prevention (CDC): 
- Call 6-333.228.6407 (1-800-CDC-INFO) or 
- Visit CDCs influenza website at www.cdc.gov/flu Vaccine Information Statement (Interim) Inactivated Influenza Vaccine 8/15/2019 
[address] CHELLE Rodas [id number] Department of Health and Sxbb[employer] Centers for Disease Control and Prevention Office Use Only

## 2020-10-14 NOTE — PROGRESS NOTES
Lexa Nolen 1950 male who presents for routine immunizations. Patient denies any symptoms , reactions or allergies that would exclude them from being immunized today. Risks and adverse reactions were discussed and the VIS was given to them. All questions were addressed. Order placed for influenza,  per Verbal Order from [address] with read back. Patient was observed for 15 min post injection. There were no reactions observed.     Hayden Gonzáles LPN

## 2020-10-15 LAB
HEMOCCULT STL QL: NEGATIVE
VALID INTERNAL CONTROL?: YES

## 2020-10-20 RX ORDER — ATORVASTATIN CALCIUM 40 MG/1
TABLET, FILM COATED ORAL
Qty: 90 TAB | Refills: 3 | Status: SHIPPED | OUTPATIENT
Start: 2020-10-20 | End: 2021-04-14 | Stop reason: SDUPTHER

## 2020-10-20 RX ORDER — FLUTICASONE PROPIONATE 50 MCG
2 SPRAY, SUSPENSION (ML) NASAL DAILY
Qty: 3 BOTTLE | Refills: 3 | Status: SHIPPED | OUTPATIENT
Start: 2020-10-20 | End: 2021-04-14 | Stop reason: SDUPTHER

## 2020-10-20 NOTE — TELEPHONE ENCOUNTER
Last Visit: 10/14/20 with MD Bruna Langley  Next Appointment: 21 with MD Bruna Langley  Previous Refill Encounter(s): 19 90 d/s with 3 refills    Requested Prescriptions     Pending Prescriptions Disp Refills    atorvastatin (LIPITOR) 40 mg tablet 90 Tab 3     Sig: Take one tablet by mouth each evening    fluticasone propionate (Flonase) 50 mcg/actuation nasal spray 3 Bottle 3     Si Sprays by Both Nostrils route daily.

## 2020-12-16 DIAGNOSIS — M15.9 PRIMARY OSTEOARTHRITIS INVOLVING MULTIPLE JOINTS: Primary | ICD-10-CM

## 2020-12-16 RX ORDER — HYDROCODONE BITARTRATE AND ACETAMINOPHEN 5; 325 MG/1; MG/1
1 TABLET ORAL
Qty: 30 TAB | Refills: 0 | Status: SHIPPED | OUTPATIENT
Start: 2020-12-16 | End: 2021-01-15

## 2020-12-16 RX ORDER — HYDROCODONE BITARTRATE AND ACETAMINOPHEN 5; 325 MG/1; MG/1
TABLET ORAL
Status: CANCELLED | OUTPATIENT
Start: 2020-12-16

## 2020-12-16 NOTE — TELEPHONE ENCOUNTER
LOV 10/14/2020  F/U 04/14/2021  Patient states he only uses this when he needs it. He would like to  printed prescription. I advised we would let him know once prescription is ready to be picked up.

## 2020-12-16 NOTE — TELEPHONE ENCOUNTER
Printed script    [place]  reports the last fill date for Norco as 2/3/20 for a 5 d/s. No UDS or CSA on file    Last Visit: 10/14/20 with MD Vijay Campuzano  Next Appointment: 4/14/21 with MD Vijay Campuzano  Previous Refill Encounter(s): 2/3/20 #30    Requested Prescriptions     Pending Prescriptions Disp Refills    HYDROcodone-acetaminophen (NORCO) 5-325 mg per tablet 30 Tab 0     Sig: Take 1 Tab by mouth every four (4) hours as needed for Pain for up to 30 days. Max Daily Amount: 6 Tabs.

## 2021-01-12 NOTE — TELEPHONE ENCOUNTER
Last Visit: 10/14/20 with MD Angela Beatty  Next Appointment: 4/14/21 with MD Angela Beatty  Previous Refill Encounter(s): 9/13/19 #90 with 3 refills    Requested Prescriptions     Pending Prescriptions Disp Refills    pantoprazole (Protonix) 40 mg tablet 90 Tab 3     Sig: Take 1 Tab by mouth daily.

## 2021-01-13 RX ORDER — PANTOPRAZOLE SODIUM 40 MG/1
40 TABLET, DELAYED RELEASE ORAL DAILY
Qty: 90 TAB | Refills: 3 | Status: SHIPPED | OUTPATIENT
Start: 2021-01-13 | End: 2021-04-14 | Stop reason: SDUPTHER

## 2021-04-08 NOTE — PROGRESS NOTES
70 y.o. WHITE male who presents for evaluation. No cardiovascular complaints. No set exercise but he tries to remain active mainly with work    He is at least 50 py smoker but stopped a long time ago. He was supposed to check on low dose CT and AAA screen coverage but did not do so    No gi or gu issues    Denies polyuria, polydipsia, nocturia, vision change. Not checking sugars at this time as he berg snot have a meter. 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 Denied focal neuro complaints, palpitations, cp, has not checked his sugar or vitals during the episodes.   They last a few seconds at most    [address]: never s not medicare b     Past Medical History:   Diagnosis Date    Allergic rhinitis     Diabetes mellitus ([facility] 2014    on basis of hba1c    Dyslipidemia     GERD (gastroesophageal reflux disease)     Hearing loss 1996    LEFT; Dr Sandro Jaimes s/p tympanoplasty, atticotomy, temporofascial graft for cholesteotoma    History of left heart catheterization 1998    negative Dr Tonya Greenberg for false positive stress test    Overweight    Ashley Coaster    Dr Vanessa Dale on bx    SCC (squamous cell carcinoma)     Dr Ryan Fernandez; face    Vocal cord paralysis 12/2004    RIGHT     Past Surgical History:   Procedure Laterality Date    HX CARPAL TUNNEL RELEASE Bilateral 1993    Dr Amanda Jean Bilateral 2009    Dr Casper Allen 6/11/12 neg    HX HEENT      LEFT ear surgery Dr Sandro Jaimes 5/96; LEFT ear tympanoplasty Dr Amairani Reynaga 5/06    [address] [facility] repair    HX ROTATOR CUFF REPAIR Right     6/20 Dr Starr Coronado; redo 7/20; redo 12/02 Dr Juvenal Baumgarten 1981     Social History     Socioeconomic History    Marital status:      Spouse name: Not on file    Number of children: 0    Years of education: Not on file    Highest education level: Not on file   Occupational History    Occupation: retired  [employer]; works [employer] resource strain: Not on file    Food insecurity     Worry: Not on file     Inability: Not on file   [employer] needs     Medical: Not on file     Non-medical: Not on file   Tobacco Use    Smoking status: Former Smoker     Packs/day: 5.00     Years: 15.00     Pack years: 75.00     Types: Cigarettes, Pipe, Cigars     Quit date: 1980     Years since quittin.3    Smokeless tobacco: Never Used   Substance and Sexual Activity    Alcohol use: No    Drug use: No    Sexual activity: Not on file   Lifestyle    Physical activity     Days per week: Not on file     Minutes per session: Not on file    Stress: Not on file   Relationships    Social connections     Talks on phone: Not on file     Gets together: Not on file     Attends Religion service: Not on file     Active member of club or organization: Not on file     Attends meetings of clubs or organizations: Not on file     Relationship status: Not on file    Intimate partner violence     Fear of current or ex partner: Not on file     Emotionally abused: Not on file     Physically abused: Not on file     Forced sexual activity: Not on file   Other Topics Concern    Not on file   Social History Narrative    Not on file     Current Outpatient Medications   Medication Sig    magnesium 250 mg tab Take 250 mg by mouth daily.  pantoprazole (Protonix) 40 mg tablet Take 1 Tab by mouth daily.  atorvastatin (LIPITOR) 40 mg tablet Take one tablet by mouth each evening    fluticasone propionate (Flonase) 50 mcg/actuation nasal spray 2 Sprays by Both Nostrils route daily.     HYDROcodone-acetaminophen (NORCO) 5-325 mg per tablet Take  by mouth every six (6) hours as needed for Pain.  FOLIC ACID/MULTIVIT-MIN/LUTEIN (CENTRUM SILVER PO) Take  by mouth daily.  ASPIRIN PO Take 81 mg by mouth daily. No current facility-administered medications for this visit. Allergies   Allergen Reactions    Augmentin [Amoxicillin-Pot Clavulanate] Rash    Medrol [Methylprednisolone] Unknown (comments)     nervous    Sulfa (Sulfonamide Antibiotics) Unknown (comments)     REVIEW OF SYSTEMS: colo 6/12  Ophtho  no vision change or eye pain  Oral  no mouth pain, tongue or tooth problems  Ears  no hearing loss, ear pain, fullness, no swallowing problems  Cardiac  no CP, PND, orthopnea, edema, palpitations or syncope  Chest  no breast masses  Resp  no wheezing, chronic coughing, dyspnea  GI  no heartburn, nausea, vomiting, change in bowel habits, bleeding, hemorrhoids  Urinary  no dysuria, hematuria, flank pain, urgency, frequency    Visit Vitals  /70   Pulse 84   Temp 97.3 °F (36.3 °C) (Temporal)   Resp 14   Ht 5' 11\" (1.803 m)   Wt 197 lb (89.4 kg)   SpO2 97%   BMI 27.48 kg/m²     A&O x3  Affect is appropriate. Mood stable  No apparent distress  Anicteric, no JVD, adenopathy or thyromegaly. No carotid bruits or radiated murmur  Lungs clear to auscultation, no wheezes or rales  Heart showed regular rate and rhythm. No murmur, rubs, gallops  Abdomen soft nontender, no hepatosplenomegaly or masses.    Ext without c/c/e, 1-2+ dp and pt pulses    LABS  From 10/11 showed          chol 230, tg 153, hdl 36, ldl-c 163  From 2/14 showed   gluc 126, cr 0.97, gfr>60,    hba1c 6.6, umar 5.9,  chol 175, tg 196, hdl 33, ldl-c 103, wbc 4.9, hb 14.9, plt 197, tsh 2.35, psa 0.50  From 2/15 showed   gluc 106, cr 1.00, gfr>60, alt 21, hba1c 6.8,    chol 146, tg 98,   hdl 38, ldl-c 88  From 8/15 showed   gluc 109, cr 0.87, gfr>60, alt 21, hba1c 6.1,    chol 174, tg 120, hdl 39, ldl-c 111  From 8/16 showed   gluc 101, cr 0.73, gfr>60, alt 38, hba1c 6.1,   chol 134, tg 152, hdl 39, ldl-c 65,   wbc 3.7, hb 13.7, plt 196, tsh 1.40, ua neg  From 8/17 showed   gluc 97,   cr 0.81, gfr>60, alt 39,     chol 157, tg 184, hdl 39, ldl-c 81,   wbc 4.2, hb 13.9, plt 219,       psa 0.3  From 3/18 showed   gluc 107, cr 0.72, gfr>60, alt 38,     chol 155, tg 128, hdl 41, ldl-c 88  From 9/18 showed   gluc 114, cr 0.71, gfr>60, alt 39,     chol 145, tg 90,   hdl 43, ldl-c 84,   wbc 3.6, hb 13.8, plt 193  From 3/19 showed   gluc 107, cr 0.76, gfr>60, alt 39,     chol 179, tg 165, hdl 43, ldl-c 103,             psa 0.3  From 8/19 showed   gluc 101, cr 0.84, gfr>60, alt 37,     chol 152, tg 92,   hdl 42, ldl-c 92,   wbc 4.7, hb 13.5, plt 207,      psa 0.3  From 3/20 showed   gluc 110, cr 0.76, gfr>60, alt 40,     chol 170, tg 93,   hdl 46, ldl-c 105  From 10/20 showed gluc 110, cr 0.79, gfr>60, alt 41, hba1c 6.4,    chol 160, tg 138, hdl 41, ldl-c 94,   wbc 4.0, hb 14.1,p lt 215,       psa 0.3    Results for orders placed or performed during the hospital encounter of 04/09/21   LIPID PANEL   Result Value Ref Range    LIPID PROFILE          Cholesterol, total 164 <200 MG/DL    Triglyceride 131 <150 MG/DL    HDL Cholesterol 40 40 - 60 MG/DL    LDL, calculated 97.8 0 - 100 MG/DL    VLDL, calculated 26.2 MG/DL    CHOL/HDL Ratio 4.1 0 - 5.0     MICROALBUMIN, UR, RAND W/ MICROALB/CREAT RATIO   Result Value Ref Range    Microalbumin,urine random 1.03 0 - 3.0 MG/DL    Creatinine, urine 167.00 (H) 30 - 125 mg/dL    Microalbumin/Creat ratio (mg/g creat) 6 0 - 30 mg/g   HEMOGLOBIN A1C W/O EAG   Result Value Ref Range    Hemoglobin A1c 6.1 (H) 4.2 - 5.6 %     We reviewed the patient's labs from the last several visits to point out trends in the numbers        Patient Active Problem List   Diagnosis Code    Sarcoidosis D86.9    [facility] E[facility]    Dyslipidemia E78.5    GERD (gastroesophageal reflux disease) K21.9    Allergic rhinitis J30.9    Overweight with body mass index (BMI) of 25 to 25.9 in adult E66.3, Z68.25     Assessment and plan:  1. Sarcoidosis. Inactive  2. DM. Declined metformin, will send for diabetic teaching. F/u ophth  3. Dyslipidemia. Continue current regimen. 4. GERD. PPI and avoidance measures  5. Allergies. Continue current regimen. 6. Lung ca and AAA screening again discussed at length. He will check with insurance for coverage  7. Finish shingrix series  8. Episodes etiology unclear. Vagal?  Asked him to check his vitals and sugar during the episodes        RTC 4/21      Above conditions discussed at length and patient vocalized understanding.   All questions answered to patient satisfaction

## 2021-04-09 ENCOUNTER — APPOINTMENT (OUTPATIENT)
Dept: INTERNAL MEDICINE CLINIC | Age: 71
End: 2021-04-09

## 2021-04-09 ENCOUNTER — HOSPITAL ENCOUNTER (OUTPATIENT)
Dept: LAB | Age: 71
Discharge: HOME | End: 2021-04-09
Payer: COMMERCIAL

## 2021-04-09 DIAGNOSIS — E11.9 TYPE 2 DIABETES MELLITUS WITHOUT COMPLICATION, WITHOUT LONG-TERM CURRENT USE OF INSULIN (HCC): ICD-10-CM

## 2021-04-09 LAB
CHOLEST SERPL-MCNC: 164 MG/DL
CREAT UR-MCNC: 167 MG/DL (ref 30–125)
HBA1C MFR BLD: 6.1 % (ref 4.2–5.6)
HDLC SERPL-MCNC: 40 MG/DL (ref 40–60)
HDLC SERPL: 4.1 {RATIO} (ref 0–5)
LDLC SERPL CALC-MCNC: 97.8 MG/DL (ref 0–100)
LIPID PROFILE,FLP: NORMAL
MICROALBUMIN UR-MCNC: 1.03 MG/DL (ref 0–3)
MICROALBUMIN/CREAT UR-RTO: 6 MG/G (ref 0–30)
TRIGL SERPL-MCNC: 131 MG/DL (ref ?–150)
VLDLC SERPL CALC-MCNC: 26.2 MG/DL

## 2021-04-09 PROCEDURE — 83036 HEMOGLOBIN GLYCOSYLATED A1C: CPT

## 2021-04-09 PROCEDURE — 82570 ASSAY OF URINE CREATININE: CPT

## 2021-04-09 PROCEDURE — 80061 LIPID PANEL: CPT

## 2021-04-14 ENCOUNTER — OFFICE VISIT (OUTPATIENT)
Dept: INTERNAL MEDICINE CLINIC | Age: 71
End: 2021-04-14
Payer: COMMERCIAL

## 2021-04-14 VITALS
TEMPERATURE: 97.3 F | BODY MASS INDEX: 27.58 KG/M2 | RESPIRATION RATE: 14 BRPM | SYSTOLIC BLOOD PRESSURE: 138 MMHG | WEIGHT: 197 LBS | OXYGEN SATURATION: 97 % | DIASTOLIC BLOOD PRESSURE: 70 MMHG | HEIGHT: 71 IN | HEART RATE: 84 BPM

## 2021-04-14 DIAGNOSIS — Z12.5 SCREENING FOR MALIGNANT NEOPLASM OF PROSTATE: ICD-10-CM

## 2021-04-14 DIAGNOSIS — E11.9 TYPE 2 DIABETES MELLITUS WITHOUT COMPLICATION, WITHOUT LONG-TERM CURRENT USE OF INSULIN (HCC): ICD-10-CM

## 2021-04-14 DIAGNOSIS — K21.9 GASTROESOPHAGEAL REFLUX DISEASE WITHOUT ESOPHAGITIS: ICD-10-CM

## 2021-04-14 DIAGNOSIS — E78.5 DYSLIPIDEMIA: Primary | ICD-10-CM

## 2021-04-14 PROCEDURE — 3044F HG A1C LEVEL LT 7.0%: CPT | Performed by: INTERNAL MEDICINE

## 2021-04-14 PROCEDURE — G8427 DOCREV CUR MEDS BY ELIG CLIN: HCPCS | Performed by: INTERNAL MEDICINE

## 2021-04-14 PROCEDURE — 1101F PT FALLS ASSESS-DOCD LE1/YR: CPT | Performed by: INTERNAL MEDICINE

## 2021-04-14 PROCEDURE — 2022F DILAT RTA XM EVC RTNOPTHY: CPT | Performed by: INTERNAL MEDICINE

## 2021-04-14 PROCEDURE — G8510 SCR DEP NEG, NO PLAN REQD: HCPCS | Performed by: INTERNAL MEDICINE

## 2021-04-14 PROCEDURE — 3017F COLORECTAL CA SCREEN DOC REV: CPT | Performed by: INTERNAL MEDICINE

## 2021-04-14 PROCEDURE — 99214 OFFICE O/P EST MOD 30 MIN: CPT | Performed by: INTERNAL MEDICINE

## 2021-04-14 PROCEDURE — G8419 CALC BMI OUT NRM PARAM NOF/U: HCPCS | Performed by: INTERNAL MEDICINE

## 2021-04-14 PROCEDURE — G8536 NO DOC ELDER MAL SCRN: HCPCS | Performed by: INTERNAL MEDICINE

## 2021-04-14 RX ORDER — PANTOPRAZOLE SODIUM 40 MG/1
40 TABLET, DELAYED RELEASE ORAL DAILY
Qty: 90 TAB | Refills: 3 | Status: SHIPPED | OUTPATIENT
Start: 2021-04-14 | End: 2022-04-28 | Stop reason: SDUPTHER

## 2021-04-14 RX ORDER — ATORVASTATIN CALCIUM 40 MG/1
TABLET, FILM COATED ORAL
Qty: 90 TAB | Refills: 3 | Status: SHIPPED | OUTPATIENT
Start: 2021-04-14 | End: 2022-03-16

## 2021-04-14 RX ORDER — ZINC GLUCONATE 10 MG
250 LOZENGE ORAL DAILY
COMMUNITY

## 2021-04-14 RX ORDER — FLUTICASONE PROPIONATE 50 MCG
2 SPRAY, SUSPENSION (ML) NASAL DAILY
Qty: 3 BOTTLE | Refills: 3 | Status: SHIPPED | OUTPATIENT
Start: 2021-04-14 | End: 2022-04-28 | Stop reason: SDUPTHER

## 2021-04-14 NOTE — PROGRESS NOTES
Estefania Hurd presents today for   Chief Complaint   Patient presents with    Diabetes     6 month f/u with labs              Depression Screening:  3 most recent PHQ Screens 4/14/2021   Little interest or pleasure in doing things Not at all   Feeling down, depressed, irritable, or hopeless Not at all   Total Score PHQ 2 0       Learning Assessment:  Learning Assessment 9/13/2019   PRIMARY LEARNER Patient   HIGHEST LEVEL OF EDUCATION - PRIMARY LEARNER  2 YEARS [facility] PRIMARY LEARNER NONE   CO-LEARNER CAREGIVER No   PRIMARY LANGUAGE ENGLISH   LEARNER PREFERENCE PRIMARY DEMONSTRATION     -   ANSWERED BY patient   RELATIONSHIP SELF       Abuse Screening:  Abuse Screening Questionnaire 4/14/2021   Do you ever feel afraid of your partner? N   Are you in a relationship with someone who physically or mentally threatens you? N   Is it safe for you to go home? Y       Fall Risk  Fall Risk Assessment, last 12 mths 4/14/2021   Able to walk? Yes   Fall in past 12 months? 0   Do you feel unsteady? 0   Are you worried about falling 0           Coordination of Care:  1. Have you been to the ER, urgent care clinic since your last visit? Hospitalized since your last visit? no    2. Have you seen or consulted any other health care providers outside of the [address] [place] [address] since your last visit? Include any pap smears or colon screening.  no

## 2021-04-20 ENCOUNTER — DOCUMENTATION ONLY (OUTPATIENT)
Dept: INTERNAL MEDICINE CLINIC | Age: 71
End: 2021-04-20

## 2021-04-20 ENCOUNTER — TELEPHONE (OUTPATIENT)
Dept: INTERNAL MEDICINE CLINIC | Age: 71
End: 2021-04-20

## 2021-04-21 NOTE — TELEPHONE ENCOUNTER
Pharmacy Progress Note - Telephone Call    Mr. Lakhwinder Ponce 70 y.o. was contacted via an outbound telephone call regarding scheduling appointment for PharmD diabetes education today. A voicemail was left for patient to return my call. Thank you,  Alejandra Tan.  ASHLEY Elizabeth    For Pharmacy Admin Tracking Only     CPA in place: YES    Time Spent (min): 5

## 2021-04-26 NOTE — TELEPHONE ENCOUNTER
Scheduled 5/10 @ 1 PM    Thank you,  ASHLEY Randle    For Pharmacy Admin Tracking Only     CPA in place: YES    Recommendation Provided To: Patient/Caregiver: 1 via Telephone   Intervention Detail: Scheduled Appointment   Total # of Interventions Recommended: 1   Total # of Interventions Accepted: 1   Intervention Accepted By: Patient/Caregiver: 1   Time Spent (min): 5 14-Mar-2021 22:12

## 2021-05-10 ENCOUNTER — OFFICE VISIT (OUTPATIENT)
Dept: INTERNAL MEDICINE CLINIC | Age: 71
End: 2021-05-10

## 2021-05-10 DIAGNOSIS — E11.9 TYPE 2 DIABETES MELLITUS WITHOUT COMPLICATION, WITHOUT LONG-TERM CURRENT USE OF INSULIN (HCC): Primary | ICD-10-CM

## 2021-05-10 DIAGNOSIS — M25.519 ACUTE SHOULDER PAIN, UNSPECIFIED LATERALITY: Primary | ICD-10-CM

## 2021-05-10 RX ORDER — INSULIN PUMP SYRINGE, 3 ML
EACH MISCELLANEOUS
Qty: 1 KIT | Refills: 0 | Status: SHIPPED | OUTPATIENT
Start: 2021-05-10

## 2021-05-10 RX ORDER — LANCETS
EACH MISCELLANEOUS
Qty: 100 EACH | Refills: 11 | Status: SHIPPED | OUTPATIENT
Start: 2021-05-10

## 2021-05-10 NOTE — PATIENT INSTRUCTIONS
Your Visit Summary:  
 
Check and document your blood sugar first thing in the morning (fasting) Bring your meter/log to all future visits. Your blood sugar goals: 
- Fasting (first thing in the morning)  blood sugar: 80 - 130 When you experience symptoms of low blood sugar (example: less than 70): - Confirm low reading by checking your blood sugar.  
- Then treat with 15 grams of carbohydrates (one-half cup of juice or regular soda, or 4-5 glucose tablets). - Wait 15 minutes to recheck blood sugar.  
- Then eat a protein containing meal/snack to prevent another low blood sugar episode. (example: peanut butter + crackers) Other recommendations: - Schedule an annual eye exam. 
- Check your feet daily for any signs of open wounds, cuts, or sores. - Given your risk factors, the following vaccines are recommended: annual flu shot, age-based pneumococcal vaccines (Pneumovax, Prevnar 13). In addition to taking your medications as directed, improving your blood sugar involves modifying your nutrition and maximizing the amount of physical activity. Nutrition: 
- When reviewing a nutrition label, focus on the serving size, total calories, fat (and type of fats), total carbohydrates, sugar (and amount of added sugar), amount of fiber (good for your digestive), and amount of protein. Refer to your nutrition label guide for more information. 
- For a meal : max 45 - 60 grams of carbohydrates - For a snack: max 15 grams of carbohydrates - Reduce amount of saturated and trans fat. Consider more unsaturated fat options as they are better for your heart health. - Have at least 1 serving of lean fat protein with each meal.   
- Increase fiber intake slowly to prevent constipation.  
- Substitute fruit juices for the whole fruit Low carb snack ideas (15 grams total carb or less): 
 
 String cheese or babybel with 6 crackers  4 peanut butter crackers  3 cups of popcorn  1 cup raw vegetables with hummus or ranch dip (just need to watch how much dip you use)  Nuts  2 rice cakes  Celery with peanut butter or cream cheese  String cheese with 1 serving of fruit  Greek yogurt (look at label to make sure < 15 gram carb)  Plain greek yogurt with fresh berries added [address] protein bar  Whisps parmesan cheese crisps  Hard boiled egg [address]  Tuna salad lettuce roll-ups  Deli meat roll-ups with slice of cheese  Sugar Free Jello  Glucerna shake (16 grams)  Glucerna hunger smart shake (16 grams)  Ensure protein max shake  Fruit (1 serving/15 grams)  1/2 banana, anamaria, or grapefruit  1/3 melon (small cantaloupe)  1 slice or 1 cup of honeydew melon  1 slice or 1 and 1/4 cups of watermelon  1 small apple, peach, orange or pear  2 small tangerines  1 cup of raspberries  3/4 cup of blackberries, blueberries or pineapples  1/2 cup of fruit juice, pears, applesauce, or mangos  17 small grapes  12 cherries Be careful with the glucerna products as they differ in the total carbs depending on the product (some are intended as meal replacements not snacks). Make sure you look at the total carbs on the label as products can differ. Physical Activity: - Aim for 30 minutes of consistent, moderately intensive, physical activity a day for 5 days or an average of 150 minutes per week. - Start slow, increase as tolerated. For example: Walk every day, working up to 30 minutes of brisk walking, 5 days a weekor split the 30 minutes into two 15-minute or three 10-minute walks. - If you sit for a long time, get up and move/stretch every 90 minutes.

## 2021-05-11 NOTE — PROGRESS NOTES
Pharmacy Progress Note - Diabetes Management    S/O: Mr. Barb Avila is a 70 y.o. male, referred by Dr. Lupe Gr MD, was seen today for diabetes management visit. Patient's last A1c was 6.1% in April 2021. This is a(n) decrease from 6.4% in October 2020. Brief History: Patient states that he has been aware of his borderline blood sugars for about 15-20 years. He states that he was never really told that he had diabetes, but was aware that his \"sugars were high\". The highest his A1c has been was 6.8% in 2015. He states that his wife had diabetes and passed away about 5 years ago. She was a baker, so he used to eat a lot cake back then, but that has since decreased (though he does still love sweets). He states that he has checked blood sugars in the past but has stopped doing it because he no longer has any supplies. He reports usually having an issue with blood sugars going too low, and he always has a candy bar or a piece of hard candy on hand in case that happens. He is not on any diabetes medications and presents today for general education and discuss obtaining diabetes testing supplies. Current anti-hyperglycemic regimen include(s):    - No diabetes medications at this time     ROS:  Today, Pt endorses:  - Symptoms of Hyperglycemia: none  - Symptoms of Hypoglycemia: jitteriness and sweating    Self Monitoring Blood Glucose (SMBG) or CGM:  - Brought in home glucometer/blood glucose log/CGM reader today:  no  - Patient not currently checking blood sugars at home, but willing to start     Nutrition/Lifestyle Modifications:  - Eats 3 meals/day ; mostly eats at home   - Breakfast: Oatmeal or bowl of grits + coffee  - Lunch: Peanut butter sandwich with a sweet such as a cookie or small cake   - Dinner: Usually whatever his granddaughter cooks, which is a balanced meal most of the time. Likes most protein except chicken. Gets a lot of vegetables in, may eat salads 2-3x per week.  Does love pasta also and may eat 2-3x per week. - Snack(s): Sweets - dark chocolate, cake or cookie (daily thing), eats 2 bananas per day. 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)       Vitals: Wt Readings from Last 3 Encounters:   04/14/21 197 lb (89.4 kg)   10/14/20 193 lb (87.5 kg)   03/19/20 192 lb 9.6 oz (87.4 kg)     BP Readings from Last 3 Encounters:   04/14/21 138/70   10/14/20 138/84   03/19/20 132/74     Pulse Readings from Last 3 Encounters:   04/14/21 84   10/14/20 79   03/19/20 79       Past Medical History:   Diagnosis Date    Allergic rhinitis     [facility]    on basis of hba1c    Dyslipidemia     GERD (gastroesophageal reflux disease)     Hearing loss 1996    LEFT; Dr Tom Chow s/p tympanoplasty, atticotomy, temporofascial graft for cholesteotoma    History of left heart catheterization 1998    negative Dr Shiela Montalvo for false positive stress test    Overweight    Mitcheal Bloch    Dr Barbara Marquez on bx    SCC (squamous cell carcinoma)     Dr Roseann Avila; face    Vocal cord paralysis 12/2004    RIGHT     Allergies   Allergen Reactions    Augmentin [Amoxicillin-Pot Clavulanate] Rash    Medrol [Methylprednisolone] Unknown (comments)     nervous    Sulfa (Sulfonamide Antibiotics) Unknown (comments)       Current Outpatient Medications   Medication Sig    Blood-Glucose Meter monitoring kit Use as directed to check blood sugars once daily. Please dispense brand covered by insurance. Dx: E11.9.    glucose blood VI test strips (ASCENSIA AUTODISC VI, ONE TOUCH ULTRA TEST VI) strip Use as directed to check blood sugars once daily. Please dispense brand covered by insurance. Dx: E11.9.    lancets misc Use as directed to check blood sugars once daily. Please dispense brand covered by insurance. Dx: E11.9.    magnesium 250 mg tab Take 250 mg by mouth daily.     pantoprazole (Protonix) 40 mg tablet Take 1 Tab by mouth daily.  atorvastatin (LIPITOR) 40 mg tablet Take one tablet by mouth each evening    fluticasone propionate (Flonase) 50 mcg/actuation nasal spray 2 Sprays by Both Nostrils route daily.  HYDROcodone-acetaminophen (NORCO) 5-325 mg per tablet Take  by mouth every six (6) hours as needed for Pain.  FOLIC ACID/MULTIVIT-MIN/LUTEIN (CENTRUM SILVER PO) Take  by mouth daily.  ASPIRIN PO Take 81 mg by mouth daily. No current facility-administered medications for this visit. Lab Results   Component Value Date/Time    Sodium 142 10/06/2020 08:36 AM    Potassium 4.4 10/06/2020 08:36 AM    Chloride 108 10/06/2020 08:36 AM    CO2 26 10/06/2020 08:36 AM    Anion gap 8 10/06/2020 08:36 AM    Glucose 110 (H) 10/06/2020 08:36 AM    BUN 21 (H) 10/06/2020 08:36 AM    Creatinine 0.79 10/06/2020 08:36 AM    BUN/Creatinine ratio 27 (H) 10/06/2020 08:36 AM    GFR est AA >60 10/06/2020 08:36 AM    GFR est non-AA >60 10/06/2020 08:36 AM    Calcium 9.2 10/06/2020 08:36 AM    Bilirubin, total 0.4 10/06/2020 08:36 AM    Alk.  phosphatase 75 10/06/2020 08:36 AM    Protein, total 7.1 10/06/2020 08:36 AM    Albumin 3.7 10/06/2020 08:36 AM    Globulin 3.4 10/06/2020 08:36 AM    A-G Ratio 1.1 10/06/2020 08:36 AM    ALT (SGPT) 41 10/06/2020 08:36 AM       Lab Results   Component Value Date/Time    Cholesterol, total 164 04/09/2021 07:42 AM    HDL Cholesterol 40 04/09/2021 07:42 AM    LDL, calculated 97.8 04/09/2021 07:42 AM    VLDL, calculated 26.2 04/09/2021 07:42 AM    Triglyceride 131 04/09/2021 07:42 AM    CHOL/HDL Ratio 4.1 04/09/2021 07:42 AM       Lab Results   Component Value Date/Time    WBC 4.0 (L) 10/06/2020 08:36 AM    HGB 14.1 10/06/2020 08:36 AM    HCT 42.2 10/06/2020 08:36 AM    PLATELET 723 07/34/5555 08:36 AM    MCV 84.7 10/06/2020 08:36 AM       Lab Results   Component Value Date/Time    Microalbumin/Creat ratio (mg/g creat) 6 04/09/2021 07:42 AM Microalbumin,urine random 1.03 04/09/2021 07:42 AM       HbA1c:  Lab Results   Component Value Date/Time    Hemoglobin A1c 6.1 (H) 04/09/2021 07:42 AM     No components found for: 2     Last Point of Care HGB A1C  No results found for: [id number]     CrCl cannot be calculated (Patient's most recent lab result is older than the maximum 180 days allowed. ). A/P:    Diabetes Management:  - Per ADA guidelines, Pt's A1c is at goal of < 7%. - Current SMBG(s)/CGM trend could not be assessed today since patient is not currently checking blood sugars at home. However, he is open to starting again.   - During the visit today reviewed with patient: self-monitoring blood glucose fasting/post-prandial goals and/or technique, importance of blood glucose control in avoidance of diabetic complications or further progression if already present, signs/symptoms/management of hypoglycemia, impact of exercise on glucose control, and diet   - Okay to continue off of medications at this time, with lifestyle modifications and monitoring of blood sugars   - Will send in prescriptions for diabetes testing supplies - patient has checked in the past and is comfortable with the process   - Patient to follow up in 1 month to make sure everything going okay     Nutrition/Lifestyle Modifications:  - Reviewed with patient utilization of the plate method as a way to encourage healthy eating. Reviewed carbohydrate and non-carbohydrate rich foods, carbohydrate content of various foods, appropriate serving sizes for carbohydrates (15 grams = 1 carb serving), reading the nutrition label focusing on total carbohydrates while being mindful of serving size, recommended serving sizes for carbohydrates for meals and snacks (45-60g with meals and less than or equal to 15g for snacks ), and discussion regarding fruits as a carbohydrate. Patient education materials were provided and reviewed with the patient for their future reference and use.   - Patient not interested in carb counting at this time. However, encouraged him to focus on small changes - decreasing amount of sweets per week, watching carb portions and choosing only 1 carb portion with each meal (especialy if eating pasta), decreasing amount of sodas per week, etc.      Medication reconciliation was completed during the visit. There are no discontinued medications. Patient verbalized understanding of the information presented and all of the patients questions were answered. AVS was handed to the patient. Patient advised to call the office with any additional questions or concerns. Notifications of recommendations will be sent to Dr. Maria L Askew MD for review. Patient will return to clinic in 4 week(s) for follow up. Thank you,  Vandana Rodriguez. ASHLEY Victor      For Pharmacy Admin Tracking Only     CPA in place:  Yes   Recommendation Provided To: Patient/Caregiver: 3 via In person   Intervention Detail: New Rx: 3, reason: Needs Additional Therapy   Gap Closed?: No   Total # of Interventions Recommended: 3   Total # of Interventions Accepted: 3   Intervention Accepted By: Patient/Caregiver: 3   Time Spent (min): 30

## 2021-05-11 NOTE — TELEPHONE ENCOUNTER
Patient requested refill of Norco during our diabetes management visit. Will pend prescription for PCP to sign if appropriate. Thank you,  Marlyn Pineda.  Preethi Thorpe, MOOKS

## 2021-05-12 RX ORDER — HYDROCODONE BITARTRATE AND ACETAMINOPHEN 5; 325 MG/1; MG/1
1 TABLET ORAL
Qty: 20 TAB | Refills: 0 | Status: SHIPPED | OUTPATIENT
Start: 2021-05-12 | End: 2021-06-11

## 2021-05-20 ENCOUNTER — TELEPHONE (OUTPATIENT)
Dept: INTERNAL MEDICINE CLINIC | Age: 71
End: 2021-05-20

## 2021-05-20 NOTE — TELEPHONE ENCOUNTER
Returned patient call. He stated that he picked up diabetes testing supplies, but could not locate the lancing device. Informed patient on where to find lancet device, and he was able to retrieve it. He states that he is familiar with the device and thinks he will be able to use it with no problem. All questions answered at this time. Keep follow up appointment on 6/7. Thank you,  D[facility] Stap[facility]. Becki Bloch, BCPS      For Pharmacy Admin Tracking Only     CPA in place:  Yes   Recommendation Provided To: Patient/Caregiver: 1 via Telephone   Time Spent (min): 10

## 2021-06-07 ENCOUNTER — OFFICE VISIT (OUTPATIENT)
Dept: INTERNAL MEDICINE CLINIC | Age: 71
End: 2021-06-07

## 2021-06-07 DIAGNOSIS — E11.9 TYPE 2 DIABETES MELLITUS WITHOUT COMPLICATION, WITHOUT LONG-TERM CURRENT USE OF INSULIN (HCC): Primary | ICD-10-CM

## 2021-06-07 NOTE — PATIENT INSTRUCTIONS
Your Visit Summary:  
 
Check and document your blood sugar first thing in the morning (fasting), before a meal, and before bedtime. Bring your meter/log to all future visits. Your blood sugar goals: 
- Fasting (first thing in the morning)  blood sugar: 80 - 130  
- 1 to 2 hours after a meal: less than 180 When you experience symptoms of low blood sugar (example: less than 70): - Confirm low reading by checking your blood sugar.  
- Then treat with 15 grams of carbohydrates (one-half cup of juice or regular soda, or 4-5 glucose tablets). - Wait 15 minutes to recheck blood sugar.  
- Then eat a protein containing meal/snack to prevent another low blood sugar episode. (example: peanut butter + crackers) Other recommendations: - Schedule an annual eye exam. 
- Check your feet daily for any signs of open wounds, cuts, or sores. - Given your risk factors, the following vaccines are recommended: annual flu shot, age-based pneumococcal vaccines (Pneumovax, Prevnar 13). In addition to taking your medications as directed, improving your blood sugar involves modifying your nutrition and maximizing the amount of physical activity. Nutrition: 
- When reviewing a nutrition label, focus on the serving size, total calories, fat (and type of fats), total carbohydrates, sugar (and amount of added sugar), amount of fiber (good for your digestive), and amount of protein. Refer to your nutrition label guide for more information. 
- For a meal : max 45 - 60 grams of carbohydrates - For a snack: max 15 grams of carbohydrates - Reduce amount of saturated and trans fat. Consider more unsaturated fat options as they are better for your heart health. - Have at least 1 serving of lean fat protein with each meal.   
- Increase fiber intake slowly to prevent constipation.  
- Substitute fruit juices for the whole fruit Low carb snack ideas (15 grams total carb or less): 
 
 String cheese or babybel with 6 crackers  4 peanut butter crackers  3 cups of popcorn  1 cup raw vegetables with hummus or ranch dip (just need to watch how much dip you use)  Nuts  2 rice cakes  Celery with peanut butter or cream cheese  String cheese with 1 serving of fruit  Greek yogurt (look at label to make sure < 15 gram carb)  Plain greek yogurt with fresh berries added [address] protein bar  Whisps parmesan cheese crisps  Hard boiled egg [address]  Tuna salad lettuce roll-ups  Deli meat roll-ups with slice of cheese  Sugar Free Jello  Glucerna shake (16 grams)  Glucerna hunger smart shake (16 grams)  Ensure protein max shake  Fruit (1 serving/15 grams)  1/2 banana, anamaria, or grapefruit  1/3 melon (small cantaloupe)  1 slice or 1 cup of honeydew melon  1 slice or 1 and 1/4 cups of watermelon  1 small apple, peach, orange or pear  2 small tangerines  1 cup of raspberries  3/4 cup of blackberries, blueberries or pineapples  1/2 cup of fruit juice, pears, applesauce, or mangos  17 small grapes  12 cherries Be careful with the glucerna products as they differ in the total carbs depending on the product (some are intended as meal replacements not snacks). Make sure you look at the total carbs on the label as products can differ. Physical Activity: - Aim for 30 minutes of consistent, moderately intensive, physical activity a day for 5 days or an average of 150 minutes per week. - Start slow, increase as tolerated. For example: Walk every day, working up to 30 minutes of brisk walking, 5 days a weekor split the 30 minutes into two 15-minute or three 10-minute walks. - If you sit for a long time, get up and move/stretch every 90 minutes.

## 2021-06-08 NOTE — PROGRESS NOTES
Pharmacy Progress Note - Diabetes Management    S/O: Mr. Bc Patel is a 70 y.o. male, referred by Dr. Es Black MD, was seen today for diabetes management follow up visit. Patient's last A1c was 6.1% in April 2021. Interim History: Patient states that he is doing well since our last visit. He was able to  his diabetes testing supplies and states that he was having some issues with checking blood sugars at home. He states that he kept seeing an error message and has to use about 3-4 strips each time he tries to use the machine. He also states that he has cut back some on sweets and is being more mindful of carb portions with meals. Current anti-hyperglycemic regimen include(s):    - Not currently on any medications for diabetes     ROS:  Today, Pt endorses:  - Symptoms of Hyperglycemia: none  - Symptoms of Hypoglycemia: none    Self Monitoring Blood Glucose (SMBG) or CGM:  - Brought in home glucometer/blood glucose log/CGM reader today:  yes  - Conducts/scans: 3-4x per day   - Fasting blood sugars are typically less than 130, with a few spikes in the 130s-140s depending upon what he eats   - All post-prandial readings are less than 180 (typically less than 150)  - Lowest readings have been down to the 80s before meals   - 14-day Average: 119  - BG in clinic today: 88    Nutrition/Lifestyle Modifications:  - States that he has been cutting back on sweets  - Continuing to be active with exercise a few times per week     Vitals:   Wt Readings from Last 3 Encounters:   04/14/21 197 lb (89.4 kg)   10/14/20 193 lb (87.5 kg)   03/19/20 192 lb 9.6 oz (87.4 kg)     BP Readings from Last 3 Encounters:   04/14/21 138/70   10/14/20 138/84   03/19/20 132/74     Pulse Readings from Last 3 Encounters:   04/14/21 84   10/14/20 79   03/19/20 79       Past Medical History:   Diagnosis Date    Allergic rhinitis     Diabetes mellitus ([facility] [address] 2014    on basis of hba1c    Dyslipidemia     GERD (gastroesophageal reflux disease)     Hearing loss 1996    LEFT; Dr Debra Ramirez s/p tympanoplasty, atticotomy, temporofascial graft for cholesteotoma    History of left heart catheterization 1998    negative Dr Andra Riley for false positive stress test    Overweight    Juanice Cozier    Dr Praveena Cross on bx    SCC (squamous cell carcinoma)     Dr Gabrielle Vu; face    Vocal cord paralysis 12/2004    RIGHT     Allergies   Allergen Reactions    Augmentin [Amoxicillin-Pot Clavulanate] Rash    Medrol [Methylprednisolone] Unknown (comments)     nervous    Sulfa (Sulfonamide Antibiotics) Unknown (comments)       Current Outpatient Medications   Medication Sig    HYDROcodone-acetaminophen (NORCO) 5-325 mg per tablet Take 1 Tab by mouth every six (6) hours as needed for Pain for up to 30 days. Max Daily Amount: 4 Tabs.  Blood-Glucose Meter monitoring kit Use as directed to check blood sugars once daily. Please dispense brand covered by insurance. Dx: E11.9.    glucose blood VI test strips (ASCENSIA AUTODISC VI, ONE TOUCH ULTRA TEST VI) strip Use as directed to check blood sugars once daily. Please dispense brand covered by insurance. Dx: E11.9.    lancets misc Use as directed to check blood sugars once daily. Please dispense brand covered by insurance. Dx: E11.9.    magnesium 250 mg tab Take 250 mg by mouth daily.  pantoprazole (Protonix) 40 mg tablet Take 1 Tab by mouth daily.  atorvastatin (LIPITOR) 40 mg tablet Take one tablet by mouth each evening    fluticasone propionate (Flonase) 50 mcg/actuation nasal spray 2 Sprays by Both Nostrils route daily.  FOLIC ACID/MULTIVIT-MIN/LUTEIN (CENTRUM SILVER PO) Take  by mouth daily.  ASPIRIN PO Take 81 mg by mouth daily. No current facility-administered medications for this visit.        Lab Results   Component Value Date/Time    Sodium 142 10/06/2020 08:36 AM    Potassium 4.4 10/06/2020 08:36 AM    Chloride 108 10/06/2020 08:36 AM    CO2 26 10/06/2020 08:36 AM Anion gap 8 10/06/2020 08:36 AM    Glucose 110 (H) 10/06/2020 08:36 AM    BUN 21 (H) 10/06/2020 08:36 AM    Creatinine 0.79 10/06/2020 08:36 AM    BUN/Creatinine ratio 27 (H) 10/06/2020 08:36 AM    GFR est AA >60 10/06/2020 08:36 AM    GFR est non-AA >60 10/06/2020 08:36 AM    Calcium 9.2 10/06/2020 08:36 AM    Bilirubin, total 0.4 10/06/2020 08:36 AM    Alk. phosphatase 75 10/06/2020 08:36 AM    Protein, total 7.1 10/06/2020 08:36 AM    Albumin 3.7 10/06/2020 08:36 AM    Globulin 3.4 10/06/2020 08:36 AM    A-G Ratio 1.1 10/06/2020 08:36 AM    ALT (SGPT) 41 10/06/2020 08:36 AM       Lab Results   Component Value Date/Time    Cholesterol, total 164 04/09/2021 07:42 AM    HDL Cholesterol 40 04/09/2021 07:42 AM    LDL, calculated 97.8 04/09/2021 07:42 AM    VLDL, calculated 26.2 04/09/2021 07:42 AM    Triglyceride 131 04/09/2021 07:42 AM    CHOL/HDL Ratio 4.1 04/09/2021 07:42 AM       Lab Results   Component Value Date/Time    WBC 4.0 (L) 10/06/2020 08:36 AM    HGB 14.1 10/06/2020 08:36 AM    HCT 42.2 10/06/2020 08:36 AM    PLATELET 186 15/20/8453 08:36 AM    MCV 84.7 10/06/2020 08:36 AM       Lab Results   Component Value Date/Time    Microalbumin/Creat ratio (mg/g creat) 6 04/09/2021 07:42 AM    Microalbumin,urine random 1.03 04/09/2021 07:42 AM       HbA1c:  Lab Results   Component Value Date/Time    Hemoglobin A1c 6.1 (H) 04/09/2021 07:42 AM     No components found for: 2     Last Point of Care HGB A1C  No results found for: GDB0CMAX     CrCl cannot be calculated (Patient's most recent lab result is older than the maximum 180 days allowed. ). A/P:    Diabetes Management:  - Per ADA guidelines, Pt's A1c is at goal of < 7%.    - Current SMBG(s)/CGM trend appears to be at goal majority of the time per review of patient's meter in clinic today   - Will continue off of medications at this time with continued monitoring    - Instructed patient to cut back on testing to once daily to preserve test strips   - [facility] through the appropriate technique on how to check blood sugars and patient able to successfully use the machine in clinic today   - Reviewed s/sx of hypoglycemia and management - patient states that he feels fine with blood sugars in 80s  - Encouraged him to continue to work on diet and exercise habits to maintain blod sugar control  - Will plan to review A1c again in October and reassess     Medication reconciliation was completed during the visit. There are no discontinued medications. Patient verbalized understanding of the information presented and all of the patients questions were answered. AVS was handed to the patient. Patient advised to call the office with any additional questions or concerns. Notifications of recommendations will be sent to Dr. Liz Luther MD for review. Thank you,  ASHLEY Ghosh      For Pharmacy Admin Tracking Only     CPA in place:  Yes   Recommendation Provided To: Patient/Caregiver: 1 via In person   Intervention Detail: Device Training   Total # of Interventions Recommended: 1   Total # of Interventions Accepted: 1   Intervention Accepted By: Patient/Caregiver: 1   Time Spent (min): 30

## 2021-08-19 DIAGNOSIS — M25.519 ACUTE SHOULDER PAIN, UNSPECIFIED LATERALITY: Primary | ICD-10-CM

## 2021-08-19 RX ORDER — HYDROCODONE BITARTRATE AND ACETAMINOPHEN 5; 325 MG/1; MG/1
1 TABLET ORAL
Qty: 20 TABLET | Refills: 0 | Status: SHIPPED | OUTPATIENT
Start: 2021-08-19 | End: 2021-09-18

## 2021-08-19 NOTE — TELEPHONE ENCOUNTER
[facility] date for Norco as 5/12/21 for a 5 d/s. Last Visit: 4/14/21 with MD Ro Fernández  Next Appointment: 10/19/21 with MD Ro Fernández  Previous Refill Encounter(s): 5/12/21 #20    Requested Prescriptions     Pending Prescriptions Disp Refills    HYDROcodone-acetaminophen (NORCO) 5-325 mg per tablet 20 Tablet 0     Sig: Take 1 Tablet by mouth every six (6) hours as needed for Pain for up to 30 days. Max Daily Amount: 4 Tablets.

## 2021-10-12 ENCOUNTER — APPOINTMENT (OUTPATIENT)
Dept: INTERNAL MEDICINE CLINIC | Age: 71
End: 2021-10-12

## 2021-10-12 ENCOUNTER — HOSPITAL ENCOUNTER (OUTPATIENT)
Dept: LAB | Age: 71
Discharge: HOME | End: 2021-10-12
Payer: COMMERCIAL

## 2021-10-12 DIAGNOSIS — E11.9 TYPE 2 DIABETES MELLITUS WITHOUT COMPLICATION, WITHOUT LONG-TERM CURRENT USE OF INSULIN (HCC): ICD-10-CM

## 2021-10-12 DIAGNOSIS — Z12.5 SCREENING FOR MALIGNANT NEOPLASM OF PROSTATE: ICD-10-CM

## 2021-10-12 DIAGNOSIS — E78.5 DYSLIPIDEMIA: ICD-10-CM

## 2021-10-12 LAB
ALBUMIN SERPL-MCNC: 3.8 G/DL (ref 3.4–5)
ALBUMIN/GLOB SERPL: 1.2 {RATIO} (ref 0.8–1.7)
ALP SERPL-CCNC: 76 U/L (ref 45–117)
ALT SERPL-CCNC: 36 U/L (ref 16–61)
ANION GAP SERPL CALC-SCNC: 6 MMOL/L (ref 3–18)
AST SERPL-CCNC: 23 U/L (ref 10–38)
BILIRUB SERPL-MCNC: 0.5 MG/DL (ref 0.2–1)
BUN SERPL-MCNC: 17 MG/DL (ref 7–18)
BUN/CREAT SERPL: 21 (ref 12–20)
CALCIUM SERPL-MCNC: 9.5 MG/DL (ref 8.5–10.1)
CHLORIDE SERPL-SCNC: 106 MMOL/L (ref 100–111)
CHOLEST SERPL-MCNC: 146 MG/DL
CO2 SERPL-SCNC: 28 MMOL/L (ref 21–32)
CREAT SERPL-MCNC: 0.81 MG/DL (ref 0.6–1.3)
CREAT UR-MCNC: 56 MG/DL (ref 30–125)
ERYTHROCYTE [DISTWIDTH] IN BLOOD BY AUTOMATED COUNT: 13.9 % (ref 11.6–14.5)
GLOBULIN SER CALC-MCNC: 3.3 G/DL (ref 2–4)
GLUCOSE SERPL-MCNC: 110 MG/DL (ref 74–99)
HBA1C MFR BLD: 5.8 % (ref 4.2–5.6)
HCT VFR BLD AUTO: 41 % (ref 36–48)
HDLC SERPL-MCNC: 40 MG/DL (ref 40–60)
HDLC SERPL: 3.7 {RATIO} (ref 0–5)
HGB BLD-MCNC: 13.3 G/DL (ref 13–16)
LDLC SERPL CALC-MCNC: 80 MG/DL (ref 0–100)
LIPID PROFILE,FLP: NORMAL
MCH RBC QN AUTO: 27.5 PG (ref 24–34)
MCHC RBC AUTO-ENTMCNC: 32.4 G/DL (ref 31–37)
MCV RBC AUTO: 84.7 FL (ref 78–100)
MICROALBUMIN UR-MCNC: <0.5 MG/DL (ref 0–3)
MICROALBUMIN/CREAT UR-RTO: NORMAL MG/G (ref 0–30)
PLATELET # BLD AUTO: 185 K/UL (ref 135–420)
PMV BLD AUTO: 10.6 FL (ref 9.2–11.8)
POTASSIUM SERPL-SCNC: 4.4 MMOL/L (ref 3.5–5.5)
PROT SERPL-MCNC: 7.1 G/DL (ref 6.4–8.2)
PSA SERPL-MCNC: 0.4 NG/ML (ref 0–4)
RBC # BLD AUTO: 4.84 M/UL (ref 4.35–5.65)
SODIUM SERPL-SCNC: 140 MMOL/L (ref 136–145)
TRIGL SERPL-MCNC: 130 MG/DL (ref ?–150)
VLDLC SERPL CALC-MCNC: 26 MG/DL
WBC # BLD AUTO: 4.2 K/UL (ref 4.6–13.2)

## 2021-10-12 PROCEDURE — 82043 UR ALBUMIN QUANTITATIVE: CPT

## 2021-10-12 PROCEDURE — 85027 COMPLETE CBC AUTOMATED: CPT

## 2021-10-12 PROCEDURE — 80061 LIPID PANEL: CPT

## 2021-10-12 PROCEDURE — 84153 ASSAY OF PSA TOTAL: CPT

## 2021-10-12 PROCEDURE — 80053 COMPREHEN METABOLIC PANEL: CPT

## 2021-10-12 PROCEDURE — 36415 COLL VENOUS BLD VENIPUNCTURE: CPT

## 2021-10-12 PROCEDURE — 83036 HEMOGLOBIN GLYCOSYLATED A1C: CPT

## 2021-10-22 NOTE — PROGRESS NOTES
70 y.o. WHITE/NON- male who presents for evaluation. No cardiovascular complaints. No set exercise but he tries to remain active mainly with work. bp in good range when he checks    He was supposed to get LDCT done but didn't; interested in getting scheduled now    No gi or gu issues    Denies polyuria, polydipsia, nocturia, vision change. BS running in the 110-140 range fasting  Weight stable    Needs refill of norco for his shoulder issues    He injured his left calf when he hit it against a steel beam just above the medial malleolus. .  It bruised quickly but he has developed significant bruising into the upper calf with tightness and discomfort; no cp, sob, pleurisy     LAST MEDICARE WELLNESS EXAM: never s not medicare b     Past Medical History:   Diagnosis Date    Allergic rhinitis     Diabetes mellitus ([facility] [address] 2014    on basis of hba1c    Dyslipidemia     GERD (gastroesophageal reflux disease)     Hearing loss 1996    LEFT; Dr Cata See s/p tympanoplasty, atticotomy, temporofascial graft for cholesteotoma    History of left heart catheterization 1998    negative Dr Mary Stone for false positive stress test    Overweight    Rogena Jointer    Dr Reveles Spine on bx    SCC (squamous cell carcinoma)     Dr Jose Oleary; face    Venous stasis     Vocal cord paralysis 12/2004    RIGHT     Past Surgical History:   Procedure Laterality Date    HX CARPAL TUNNEL RELEASE Bilateral 1993    Dr Marybel Cerda HX CATARACT REMOVAL Bilateral 2009    Dr Thalia Gordon 6/11/12 neg    HX HEENT      LEFT ear surgery Dr Cata See 5/96; LEFT ear tympanoplasty Dr Mikie Galindo 5/06    [facility] repair    HX ROTATOR CUFF REPAIR Right     6/20 Dr Merlin Landeros; redo 7/20; redo 12/02 Dr Gladys Gallagher TONSILLECTOMY  07/1981     Social History     Socioeconomic History    Marital status:      Spouse name: Not on file    Number of children: 0    Years of education: Not on file   [employer] Highest education level: Not on file   Occupational History    Occupation: retired  [employer]; works [employer]   Tobacco Use    Smoking status: Former Smoker     Packs/day: 5.00     Years: 15.00     Pack years: 75.00     Types: Cigarettes, Pipe, Cigars     Quit date: 1980     Years since quittin.8    Smokeless tobacco: Never Used   Substance and Sexual Activity    Alcohol use: No    Drug use: No    Sexual activity: Not on file   Other Topics Concern    Not on file   Social History Narrative    Not on file     Social Determinants of Health     Financial Resource Strain:     Difficulty of Paying Living Expenses:    Food Insecurity:     Worried About [address] in the Last Year:     [address] in the Last Year:    Transportation Needs:     Lack of Transportation (Medical):  Lack of Transportation (Non-Medical):    Physical Activity:     Days of Exercise per Week:     Minutes of Exercise per Session:    Stress:     Feeling of Stress :    Social Connections:     Frequency of Communication with Friends and Family:     Frequency of Social Gatherings with Friends and Family:     Attends Taoist Services:     Active Member of Clubs or Organizations:     Attends Club or Organization Meetings:     Marital Status:    Intimate Partner Violence:     Fear of Current or Ex-Partner:     Emotionally Abused:     Physically Abused:     Sexually Abused:      Current Outpatient Medications   Medication Sig    Blood-Glucose Meter monitoring kit Use as directed to check blood sugars once daily. Please dispense brand covered by insurance. Dx: E11.9.    glucose blood VI test strips (ASCENSIA AUTODISC VI, ONE TOUCH ULTRA TEST VI) strip Use as directed to check blood sugars once daily. Please dispense brand covered by insurance. Dx: E11.9.    lancets misc Use as directed to check blood sugars once daily. Please dispense brand covered by insurance. Dx: E11.9.     magnesium 250 mg tab Take 250 mg by mouth daily.  pantoprazole (Protonix) 40 mg tablet Take 1 Tab by mouth daily.  atorvastatin (LIPITOR) 40 mg tablet Take one tablet by mouth each evening    fluticasone propionate (Flonase) 50 mcg/actuation nasal spray 2 Sprays by Both Nostrils route daily.  FOLIC ACID/MULTIVIT-MIN/LUTEIN (CENTRUM SILVER PO) Take  by mouth daily.  ASPIRIN PO Take 81 mg by mouth daily. No current facility-administered medications for this visit. Allergies   Allergen Reactions    Augmentin [Amoxicillin-Pot Clavulanate] Rash    Medrol [Methylprednisolone] Unknown (comments)     nervous    Sulfa (Sulfonamide Antibiotics) Unknown (comments)     REVIEW OF SYSTEMS: colo 6/12  Ophtho  no vision change or eye pain  Oral  no mouth pain, tongue or tooth problems  Ears  no hearing loss, ear pain, fullness, no swallowing problems  Cardiac  no CP, PND, orthopnea, edema, palpitations or syncope  Chest  no breast masses  Resp  no wheezing, chronic coughing, dyspnea  GI  no heartburn, nausea, vomiting, change in bowel habits, bleeding, hemorrhoids  Urinary  no dysuria, hematuria, flank pain, urgency, frequency    Visit Vitals  /74   Pulse 66   Temp 97.7 °F (36.5 °C) (Temporal)   Resp 16   Ht 5' 11\" (1.803 m)   Wt 189 lb (85.7 kg)   SpO2 95%   BMI 26.36 kg/m²     A&O x3  Affect is appropriate. Mood stable  No apparent distress  Anicteric, no JVD, adenopathy or thyromegaly. No carotid bruits or radiated murmur  Lungs clear to auscultation, no wheezes or rales  Heart showed regular rate and rhythm. No murmur, rubs, gallops  Abdomen soft nontender, no hepatosplenomegaly or masses.    Ext without c/c/e, 1-2+ dp and pt pulses right; venous stasisi changes bilat; left leg grissly swollen with scattered bruising, nontender, no signs cellulitis    LABS  From 10/11 showed          chol 230, tg 153, hdl 36, ldl-c 163  From 2/14 showed   gluc 126, cr 0.97, gfr>60,    hba1c 6.6, umar 5.9, chol 175, tg 196, hdl 33, ldl-c 103, wbc 4.9, hb 14.9, plt 197, tsh 2.35, psa 0.50  From 2/15 showed   gluc 106, cr 1.00, gfr>60, alt 21, hba1c 6.8,    chol 146, tg 98,   hdl 38, ldl-c 88  From 8/15 showed   gluc 109, cr 0.87, gfr>60, alt 21, hba1c 6.1,    chol 174, tg 120, hdl 39, ldl-c 111  From 8/16 showed   gluc 101, cr 0.73, gfr>60, alt 38, hba1c 6.1,   chol 134, tg 152, hdl 39, ldl-c 65,   wbc 3.7, hb 13.7, plt 196, tsh 1.40, ua neg  From 8/17 showed   gluc 97,   cr 0.81, gfr>60, alt 39,     chol 157, tg 184, hdl 39, ldl-c 81,   wbc 4.2, hb 13.9, plt 219,       psa 0.3  From 3/18 showed   gluc 107, cr 0.72, gfr>60, alt 38,     chol 155, tg 128, hdl 41, ldl-c 88  From 9/18 showed   gluc 114, cr 0.71, gfr>60, alt 39,     chol 145, tg 90,   hdl 43, ldl-c 84,   wbc 3.6, hb 13.8, plt 193  From 3/19 showed   gluc 107, cr 0.76, gfr>60, alt 39,     chol 179, tg 165, hdl 43, ldl-c 103,             psa 0.3  From 8/19 showed   gluc 101, cr 0.84, gfr>60, alt 37,     chol 152, tg 92,   hdl 42, ldl-c 92,   wbc 4.7, hb 13.5, plt 207,      psa 0.3  From 3/20 showed   gluc 110, cr 0.76, gfr>60, alt 40,     chol 170, tg 93,   hdl 46, ldl-c 105  From 10/20 showed gluc 110, cr 0.79, gfr>60, alt 41, hba1c 6.4,    chol 160, tg 138, hdl 41, ldl-c 94,   wbc 4.0, hb 14.1,p lt 215,       psa 0.3  From 4/21 showed        hba1c 6.1, umar 6,     chol 164, tg 131, hdl 40, ldl-c 98    Results for orders placed or performed during the hospital encounter of 10/12/21   CBC W/O DIFF   Result Value Ref Range    WBC 4.2 (L) 4.6 - 13.2 K/uL    RBC 4.84 4.35 - 5.65 M/uL    HGB 13.3 13.0 - 16.0 g/dL    HCT 41.0 36.0 - 48.0 %    MCV 84.7 78.0 - 100.0 FL    MCH 27.5 24.0 - 34.0 PG    MCHC 32.4 31.0 - 37.0 g/dL    RDW 13.9 11.6 - 14.5 %    PLATELET 094 249 - 646 K/uL    MPV 10.6 9.2 - 11.8 FL   LIPID PANEL   Result Value Ref Range    LIPID PROFILE          Cholesterol, total 146 <200 MG/DL    Triglyceride 130 <150 MG/DL    HDL Cholesterol 40 40 - 60 MG/DL    LDL, calculated 80 0 - 100 MG/DL    VLDL, calculated 26 MG/DL    CHOL/HDL Ratio 3.7 0 - 5.0     METABOLIC PANEL, COMPREHENSIVE   Result Value Ref Range    Sodium 140 136 - 145 mmol/L    Potassium 4.4 3.5 - 5.5 mmol/L    Chloride 106 100 - 111 mmol/L    CO2 28 21 - 32 mmol/L    Anion gap 6 3.0 - 18 mmol/L    Glucose 110 (H) 74 - 99 mg/dL    BUN 17 7.0 - 18 MG/DL    Creatinine 0.81 0.6 - 1.3 MG/DL    BUN/Creatinine ratio 21 (H) 12 - 20      GFR est AA >60 >60 ml/min/1.73m2    GFR est non-AA >60 >60 ml/min/1.73m2    Calcium 9.5 8.5 - 10.1 MG/DL    Bilirubin, total 0.5 0.2 - 1.0 MG/DL    ALT (SGPT) 36 16 - 61 U/L    AST (SGOT) 23 10 - 38 U/L    Alk. phosphatase 76 45 - 117 U/L    Protein, total 7.1 6.4 - 8.2 g/dL    Albumin 3.8 3.4 - 5.0 g/dL    Globulin 3.3 2.0 - 4.0 g/dL    A-G Ratio 1.2 0.8 - 1.7     MICROALBUMIN, UR, RAND W/ MICROALB/CREAT RATIO   Result Value Ref Range    Microalbumin,urine random <0.50 0 - 3.0 MG/DL    Creatinine, urine 56.00 30 - 125 mg/dL    Microalbumin/Creat ratio (mg/g creat)  0 - 30 mg/g     Cannot calculate ratio due to microalbumin result outside reportable range. HEMOGLOBIN A1C W/O EAG   Result Value Ref Range    Hemoglobin A1c 5.8 (H) 4.2 - 5.6 %   PSA SCREENING (SCREENING)   Result Value Ref Range    Prostate Specific Ag 0.4 0.0 - 4.0 ng/mL     We reviewed the patient's labs from the last several visits to point out trends in the numbers        Patient Active Problem List   Diagnosis Code    Sarcoidosis D86.9    Type 2 diabetes mellitus ([facility] [address] E11.9    Dyslipidemia E78.5    GERD (gastroesophageal reflux disease) K21.9    Allergic rhinitis J30.9    Overweight with body mass index (BMI) of 25 to 25.9 in adult E66.3, Z68.25     Assessment and plan:  1. Sarcoidosis. Inactive  2. DM. Controlled on dietary mgmt. F/u ophth  3. Dyslipidemia. At target on lipitor  4. GERD. PPI and avoidance measures  5. Allergies. Continue current regimen. 6. Lung ca screening.  LDCT ordered  7. R/o dvt. Duplex ordered, quick discussion on [address]        RTC 4/22      Above conditions discussed at length and patient vocalized understanding. All questions answered to patient satisfaction        ICD-10-CM ICD-9-CM    1. Leg swelling  M79.89 729.81 DUPLEX LOWER EXT VENOUS LEFT   2. Needs flu shot  Z23 V04.81 FLU (FLUAD QUAD INFLUENZA VACCINE,QUAD,ADJUVANTED)   3. Personal history of smoking  Z87. 891 V15.82 CT LOW DOSE LUNG CANCER SCREENING   4. Gastroesophageal reflux disease without esophagitis  K21.9 530.81    5. Type 2 diabetes mellitus without complication, without long-term current use of insulin (HCC)  E11.9 250.00 HEMOGLOBIN A1C WITH EAG   6. Overweight with body mass index (BMI) of 25 to 25.9 in adult  E66.3 278.02     Z68.25 V85.21    7.  Dyslipidemia  E78.5 272.4 LIPID PANEL

## 2021-10-25 ENCOUNTER — OFFICE VISIT (OUTPATIENT)
Dept: INTERNAL MEDICINE CLINIC | Age: 71
End: 2021-10-25
Payer: COMMERCIAL

## 2021-10-25 VITALS
WEIGHT: 189 LBS | HEART RATE: 66 BPM | SYSTOLIC BLOOD PRESSURE: 137 MMHG | DIASTOLIC BLOOD PRESSURE: 74 MMHG | BODY MASS INDEX: 26.46 KG/M2 | HEIGHT: 71 IN | TEMPERATURE: 97.7 F | RESPIRATION RATE: 16 BRPM | OXYGEN SATURATION: 95 %

## 2021-10-25 DIAGNOSIS — K21.9 GASTROESOPHAGEAL REFLUX DISEASE WITHOUT ESOPHAGITIS: ICD-10-CM

## 2021-10-25 DIAGNOSIS — Z87.891 PERSONAL HISTORY OF SMOKING: ICD-10-CM

## 2021-10-25 DIAGNOSIS — E11.9 TYPE 2 DIABETES MELLITUS WITHOUT COMPLICATION, WITHOUT LONG-TERM CURRENT USE OF INSULIN (HCC): ICD-10-CM

## 2021-10-25 DIAGNOSIS — M79.89 LEG SWELLING: Primary | ICD-10-CM

## 2021-10-25 DIAGNOSIS — E78.5 DYSLIPIDEMIA: ICD-10-CM

## 2021-10-25 DIAGNOSIS — Z23 NEEDS FLU SHOT: ICD-10-CM

## 2021-10-25 DIAGNOSIS — E66.3 OVERWEIGHT WITH BODY MASS INDEX (BMI) OF 25 TO 25.9 IN ADULT: ICD-10-CM

## 2021-10-25 PROCEDURE — 90471 IMMUNIZATION ADMIN: CPT | Performed by: INTERNAL MEDICINE

## 2021-10-25 PROCEDURE — G8536 NO DOC ELDER MAL SCRN: HCPCS | Performed by: INTERNAL MEDICINE

## 2021-10-25 PROCEDURE — 3017F COLORECTAL CA SCREEN DOC REV: CPT | Performed by: INTERNAL MEDICINE

## 2021-10-25 PROCEDURE — 2022F DILAT RTA XM EVC RTNOPTHY: CPT | Performed by: INTERNAL MEDICINE

## 2021-10-25 PROCEDURE — G8419 CALC BMI OUT NRM PARAM NOF/U: HCPCS | Performed by: INTERNAL MEDICINE

## 2021-10-25 PROCEDURE — 3044F HG A1C LEVEL LT 7.0%: CPT | Performed by: INTERNAL MEDICINE

## 2021-10-25 PROCEDURE — 90694 VACC AIIV4 NO PRSRV 0.5ML IM: CPT | Performed by: INTERNAL MEDICINE

## 2021-10-25 PROCEDURE — G8510 SCR DEP NEG, NO PLAN REQD: HCPCS | Performed by: INTERNAL MEDICINE

## 2021-10-25 PROCEDURE — 1101F PT FALLS ASSESS-DOCD LE1/YR: CPT | Performed by: INTERNAL MEDICINE

## 2021-10-25 PROCEDURE — G8427 DOCREV CUR MEDS BY ELIG CLIN: HCPCS | Performed by: INTERNAL MEDICINE

## 2021-10-25 PROCEDURE — 99214 OFFICE O/P EST MOD 30 MIN: CPT | Performed by: INTERNAL MEDICINE

## 2021-10-25 NOTE — PROGRESS NOTES
Josué Gallardo presents today for   Chief Complaint   Patient presents with    Follow-up     6 month    Cholesterol Problem    Diabetes    Labs     10-12-21              Coordination of Care:  1. Have you been to the ER, urgent care clinic since your last visit? Hospitalized since your last visit? NO    2. Have you seen or consulted any other health care providers outside of the [address] [place] [address] since your last visit? Include any pap smears or colon screening.  YES

## 2021-10-25 NOTE — PATIENT INSTRUCTIONS
Vaccine Information Statement    Influenza (Flu) Vaccine (Inactivated or Recombinant): What You Need to Know    Many vaccine information statements are available in Portuguese and other languages. See www.immunize.org/vis. Hojas de información sobre vacunas están disponibles en español y en muchos otros idiomas. Visite www.immunize.org/vis. 1. Why get vaccinated? Influenza vaccine can prevent influenza (flu). Flu is a contagious disease that spreads around the [facility] every year, usually between October and May. Anyone can get the flu, but it is more dangerous for some people. Infants and young children, people 72 years and older, pregnant people, and people with certain health conditions or a weakened immune system are at greatest risk of flu complications. Pneumonia, bronchitis, sinus infections, and ear infections are examples of flu-related complications. If you have a medical condition, such as heart disease, cancer, or diabetes, flu can make it worse. Flu can cause fever and chills, sore throat, muscle aches, fatigue, cough, headache, and runny or stuffy nose. Some people may have vomiting and diarrhea, though this is more common in children than adults. In an average year, thousands of people in the [facility] die from flu, and many more are hospitalized. Flu vaccine prevents millions of illnesses and flu-related visits to the doctor each year. 2. Influenza vaccines     CDC recommends everyone 6 months and older get vaccinated every flu season. Children 6 months through 6years of age may need 2 doses during a single flu season. Everyone else needs only 1 dose each flu season. It takes about 2 weeks for protection to develop after vaccination. There are many flu viruses, and they are always changing. Each year a new flu vaccine is made to protect against the influenza viruses believed to be likely to cause disease in the upcoming flu season.  Even when the vaccine doesnt exactly match these viruses, it may still provide some protection. Influenza vaccine does not cause flu. Influenza vaccine may be given at the same time as other vaccines. 3. Talk with your health care provider    Tell your vaccination provider if the person getting the vaccine:   Has had an allergic reaction after a previous dose of influenza vaccine, or has any severe, life-threatening allergies    Has ever had Guillain-Barré Syndrome (also called GBS)    In some cases, your health care provider may decide to postpone influenza vaccination until a future visit. Influenza vaccine can be administered at any time during pregnancy. People who are or will be pregnant during influenza season should receive inactivated influenza vaccine. People with minor illnesses, such as a cold, may be vaccinated. People who are moderately or severely ill should usually wait until they recover before getting influenza vaccine. Your health care provider can give you more information. 4. Risks of a vaccine reaction     Soreness, redness, and swelling where the shot is given, fever, muscle aches, and headache can happen after influenza vaccination.  There may be a very small increased risk of Guillain-Barré Syndrome (GBS) after inactivated influenza vaccine (the flu shot). [facility] children who get the flu shot along with pneumococcal vaccine (PCV13) and/or DTaP vaccine at the same time might be slightly more likely to have a seizure caused by fever. Tell your health care provider if a child who is getting flu vaccine has ever had a seizure. People sometimes faint after medical procedures, including vaccination. Tell your provider if you feel dizzy or have vision changes or ringing in the ears. As with any medicine, there is a very remote chance of a vaccine causing a severe allergic reaction, other serious injury, or death. 5. What if there is a serious problem?     An allergic reaction could occur after the vaccinated person leaves the clinic. If you see signs of a severe allergic reaction (hives, swelling of the face and throat, difficulty breathing, a fast heartbeat, dizziness, or weakness), call 9-1-1 and get the person to the nearest hospital.    For other signs that concern you, call your health care provider. Adverse reactions should be reported to the Vaccine Adverse Event Reporting System (VAERS). Your health care provider will usually file this report, or you can do it yourself. Visit the VAERS website at www.vaers. [facility].gov or call 5-536.848.1075. VAERS is only for reporting reactions, and VAERS staff members do not give medical advice. 6. The National Vaccine Injury Compensation Program    The [facility] Vaccine Injury Compensation Program (VICP) is a federal program that was created to compensate people who may have been injured by certain vaccines. Claims regarding alleged injury or death due to vaccination have a time limit for filing, which may be as short as two years. Visit the VICP website at www.[facility].gov/vaccinecompensation or call 3-914.757.7728 to learn about the program and about filing a claim. 7. How can I learn more?  Ask your health care provider.  Call your local or state health department.  Visit the website of the Food and Drug Administration (FDA) for vaccine package inserts and additional information at www.fda.gov/vaccines-blood-biologics/vaccines.  Contact the Centers for Disease Control and Prevention (CDC):  - Call 6-635.856.4847 (8-281-XUL-INFO) or  - Visit CDCs influenza website at www.cdc.gov/flu. Vaccine Information Statement   Inactivated Influenza Vaccine   8/6/2021  42 . [facility] [id number]   Department of Health and Human Services  Centers for Disease Control and Prevention    Office Use Only

## 2021-10-26 ENCOUNTER — DOCUMENTATION ONLY (OUTPATIENT)
Dept: INTERNAL MEDICINE CLINIC | Age: 71
End: 2021-10-26

## 2021-10-26 DIAGNOSIS — M79.89 LEG SWELLING: ICD-10-CM

## 2021-10-26 NOTE — PROGRESS NOTES
Pharmacy Progress Note     Most recent A1c reviewed: 5.8% in October 2021. Patient doing well off of diabetes medications at this time. No follow up scheduled unless requested by PCP or patient. Thank you,  Vidya Kemp. ASHLEY Pizarro        For Pharmacy Admin Tracking Only     CPA in place:  Yes   Recommendation Provided To: Patient/Caregiver: 0 via [place] 20 Time Spent (min): 10

## 2021-10-27 ENCOUNTER — TELEPHONE (OUTPATIENT)
Dept: INTERNAL MEDICINE CLINIC | Age: 71
End: 2021-10-27

## 2021-10-27 NOTE — TELEPHONE ENCOUNTER
Pt states he is returning Qian's call from yesterday. No telephone encounters found.  Please call him at [phone]

## 2021-10-27 NOTE — TELEPHONE ENCOUNTER
Returned call to patient, was calling to let patient know that his referral was faxed to [facility] scheduling (Monday), he stated he scheduled his doppler study with [facility].

## 2021-10-28 ENCOUNTER — HOSPITAL ENCOUNTER (OUTPATIENT)
Dept: VASCULAR SURGERY | Age: 71
Discharge: HOME | End: 2021-10-28
Attending: INTERNAL MEDICINE
Payer: COMMERCIAL

## 2021-10-28 ENCOUNTER — APPOINTMENT (OUTPATIENT)
Dept: CT IMAGING | Age: 71
End: 2021-10-28
Attending: INTERNAL MEDICINE
Payer: COMMERCIAL

## 2021-10-28 PROCEDURE — 93971 EXTREMITY STUDY: CPT

## 2021-11-01 ENCOUNTER — TELEPHONE (OUTPATIENT)
Dept: INTERNAL MEDICINE CLINIC | Age: 71
End: 2021-11-01

## 2021-11-01 DIAGNOSIS — M19.90 ARTHRITIS: Primary | ICD-10-CM

## 2021-11-01 RX ORDER — HYDROCODONE BITARTRATE AND ACETAMINOPHEN 5; 325 MG/1; MG/1
1 TABLET ORAL
Qty: 30 TABLET | Refills: 0 | Status: SHIPPED | OUTPATIENT
Start: 2021-11-01 | End: 2021-11-11

## 2021-11-01 NOTE — TELEPHONE ENCOUNTER
Patient is asking if Dr. Angela Beatty received the results for a scan on his left leg? He said he got the scan done on Friday 10/29/21.      If so, patient is requesting a call back in regards to the scan, can be reached at [phone]    Please advise, thank you

## 2021-11-01 NOTE — TELEPHONE ENCOUNTER
Pt says he was supposed to get a refill on Vicodin at last visit. 5/325 mg Q 20 tablets    [facility].

## 2021-11-02 NOTE — TELEPHONE ENCOUNTER
Patient is aware :  Venous duplex neg for dvt  Continue local care, elevation. Patient verbalizes understanding.

## 2021-11-09 ENCOUNTER — HOSPITAL ENCOUNTER (OUTPATIENT)
Dept: CT IMAGING | Age: 71
Discharge: HOME | End: 2021-11-09
Attending: INTERNAL MEDICINE
Payer: COMMERCIAL

## 2021-11-09 VITALS — WEIGHT: 185 LBS | BODY MASS INDEX: 25.9 KG/M2 | HEIGHT: 71 IN

## 2021-11-09 DIAGNOSIS — Z87.891 PERSONAL HISTORY OF SMOKING: ICD-10-CM

## 2021-11-09 PROCEDURE — 71271 CT THORAX LUNG CANCER SCR C-: CPT

## 2021-11-11 ENCOUNTER — TELEPHONE (OUTPATIENT)
Dept: INTERNAL MEDICINE CLINIC | Age: 71
End: 2021-11-11

## 2021-11-11 DIAGNOSIS — R91.8 PULMONARY NODULES: Primary | ICD-10-CM

## 2021-11-11 NOTE — TELEPHONE ENCOUNTER
Patient reached and given information and verbalized understanding. He stated that he has been told previously that he has sarcoidosis of his lungs and had biopsies taken about two years ago.

## 2021-11-11 NOTE — TELEPHONE ENCOUNTER
pls call      IMPRESSION  1. Multiple small 6 mm or less pulmonary nodules in both lungs. 2.  Mild bronchiectatic changes of bilateral lung bases. 3.  Cholelithiasis without evidence of acute cholecystitis. 4.  Coronary artery atherosclerotic disease.         Mult lung nodules - radiology rec follow up in 6 mos instead of 1 year to document stability  Asymptomatic gallstones - observation

## 2021-12-03 ENCOUNTER — TELEPHONE (OUTPATIENT)
Dept: INTERNAL MEDICINE CLINIC | Age: 71
End: 2021-12-03

## 2021-12-03 NOTE — TELEPHONE ENCOUNTER
Notified pt the papers he brought in for rd to review are ready to . Copy of the [employer] form sent to scanning per RD.

## 2021-12-22 DIAGNOSIS — E11.9 TYPE 2 DIABETES MELLITUS WITHOUT COMPLICATION, WITHOUT LONG-TERM CURRENT USE OF INSULIN (HCC): ICD-10-CM

## 2021-12-23 RX ORDER — BLOOD SUGAR DIAGNOSTIC
STRIP MISCELLANEOUS
Qty: 100 STRIP | Refills: 3 | Status: SHIPPED | OUTPATIENT
Start: 2021-12-23

## 2022-03-16 RX ORDER — ATORVASTATIN CALCIUM 40 MG/1
TABLET, FILM COATED ORAL
Qty: 90 TABLET | Refills: 3 | Status: SHIPPED | OUTPATIENT
Start: 2022-03-16 | End: 2022-04-28 | Stop reason: DRUGHIGH

## 2022-03-18 PROBLEM — J30.9 ALLERGIC RHINITIS: Status: ACTIVE | Noted: 2018-03-08

## 2022-03-19 PROBLEM — E66.3 OVERWEIGHT WITH BODY MASS INDEX (BMI) OF 25 TO 25.9 IN ADULT: Status: ACTIVE | Noted: 2020-10-12

## 2022-04-22 ENCOUNTER — HOSPITAL ENCOUNTER (OUTPATIENT)
Dept: LAB | Age: 72
Discharge: HOME | End: 2022-04-22
Payer: COMMERCIAL

## 2022-04-22 ENCOUNTER — APPOINTMENT (OUTPATIENT)
Dept: INTERNAL MEDICINE CLINIC | Age: 72
End: 2022-04-22

## 2022-04-22 DIAGNOSIS — E11.9 TYPE 2 DIABETES MELLITUS WITHOUT COMPLICATION, WITHOUT LONG-TERM CURRENT USE OF INSULIN (HCC): ICD-10-CM

## 2022-04-22 DIAGNOSIS — E78.5 DYSLIPIDEMIA: ICD-10-CM

## 2022-04-22 LAB
CHOLEST SERPL-MCNC: 172 MG/DL
EST. AVERAGE GLUCOSE BLD GHB EST-MCNC: 128 MG/DL
HBA1C MFR BLD: 6.1 % (ref 4.2–5.6)
HDLC SERPL-MCNC: 40 MG/DL (ref 40–60)
HDLC SERPL: 4.3 {RATIO} (ref 0–5)
LDLC SERPL CALC-MCNC: 105.4 MG/DL (ref 0–100)
LIPID PROFILE,FLP: ABNORMAL
TRIGL SERPL-MCNC: 133 MG/DL (ref ?–150)
VLDLC SERPL CALC-MCNC: 26.6 MG/DL

## 2022-04-22 PROCEDURE — 80061 LIPID PANEL: CPT

## 2022-04-22 PROCEDURE — 36415 COLL VENOUS BLD VENIPUNCTURE: CPT

## 2022-04-22 PROCEDURE — 83036 HEMOGLOBIN GLYCOSYLATED A1C: CPT

## 2022-04-24 NOTE — PROGRESS NOTES
67 y.o. WHITE/NON- male who presents for evaluation. No cardiovascular complaints. No set exercise but he remains active mainly with work and chores. He did get LDCT and pulm nodules noted, he is supposed to get follow up next month    No gi or gu issues    Denies polyuria, polydipsia, nocturia, vision change. Weight stable    He's had 2 episodes of dizziness around [place] and [place]. Both episode lasted 1-2 days max, could reproduce the sx with changes in body/head position, no associated n/v/hearing loss/ringing.       LAST MEDICARE WELLNESS EXAM: never as not medicare b     Past Medical History:   Diagnosis Date    Allergic rhinitis     Carotid disease, bilateral ([facility] [address]     comm screen mild BICA, AAA neg, RABI 1.00/LABI 1.06 (11/21)    Diabetes mellitus ([facility] [address] 2014    on basis of hba1c    Dyslipidemia     GERD (gastroesophageal reflux disease)     Hearing loss 1996    LEFT; Dr Tatum Peterson s/p tympanoplasty, atticotomy, temporofascial graft for cholesteotoma    History of left heart catheterization 1998    negative Dr Noni Hopkins for false positive stress test    Overweight     Pulmonary nodules 11/2021    LDCT showed mult <6mm bilat nodules, mild bronchiectatic changes, gallstones, coronary calcifications (11/21)   Leopoldo Sanchez Dust on bx    SCC (squamous cell carcinoma)     Dr Tano Allen; face    Venous stasis     Vocal cord paralysis 12/2004    RIGHT     Past Surgical History:   Procedure Laterality Date    HX Letha Walden Bilateral 1993    Dr Maverick León Bilateral 2009    Dr Srinath Sandra 6/11/12 neg    HX HEENT      LEFT ear surgery Dr Tatum Peterson 5/96; LEFT ear tympanoplasty Dr Loretha Frankel 5/06    [facility] repair    HX ROTATOR CUFF REPAIR Right     6/20 Dr Omid Currie; redo 7/20; redo 12/02 Dr Bob Owen TONSILLECTOMY  07/1981     Social History     Socioeconomic History    Marital status:  Spouse name: Not on file    Number of children: 0    Years of education: Not on file    Highest education level: Not on file   Occupational History    Occupation: retired  [employer]; works [employer]   Tobacco Use    Smoking status: Former Smoker     Packs/day: 5.00     Years: 15.00     Pack years: 75.00     Types: Cigarettes, Pipe, Cigars     Quit date: 1980     Years since quittin.3    Smokeless tobacco: Never Used   Substance and Sexual Activity    Alcohol use: No    Drug use: No    Sexual activity: Not on file   Other Topics Concern    Not on file   Social History Narrative    Not on file     Social Determinants of Health     Financial Resource Strain:     Difficulty of Paying Living Expenses: Not on file   Food Insecurity:     Worried About [address] in the Last Year: Not on file    [address] [employer] in the Last Year: Not on file   Transportation Needs:     Lack of Transportation (Medical): Not on file    Lack of Transportation (Non-Medical):  Not on file   Physical Activity:     Days of Exercise per Week: Not on file    Minutes of Exercise per Session: Not on file   Stress:     Feeling of Stress : Not on file   Social Connections:     Frequency of Communication with Friends and Family: Not on file    Frequency of Social Gatherings with Friends and Family: Not on file    Attends Bahai Services: Not on file    Active Member of [address] [place] [address] or Organizations: Not on file    Attends Club or Organization Meetings: Not on file    Marital Status: Not on file   Intimate Partner Violence:     Fear of Current or Ex-Partner: Not on file    Emotionally Abused: Not on file    Physically Abused: Not on file    Sexually Abused: Not on file   Housing Stability:     Unable to Pay for Housing in the Last Year: Not on file    Number of [place] in the Last Year: Not on file    Unstable Housing in the Last Year: Not on file     Current Outpatient Medications Medication Sig    fluticasone propionate (Flonase) 50 mcg/actuation nasal spray 2 Sprays by Both Nostrils route daily.  pantoprazole (Protonix) 40 mg tablet Take 1 Tablet by mouth daily.  atorvastatin (LIPITOR) 80 mg tablet Take 1 Tablet by mouth daily.  glucose blood VI test strips (OneTouch Verio test strips) strip USE AS DIRECTED TO CHECK   BLOOD SUGARS ONCE DAILY.  Blood-Glucose Meter monitoring kit Use as directed to check blood sugars once daily. Please dispense brand covered by insurance. Dx: E11.9.    lancets misc Use as directed to check blood sugars once daily. Please dispense brand covered by insurance. Dx: E11.9.    magnesium 250 mg tab Take 250 mg by mouth daily.  FOLIC ACID/MULTIVIT-MIN/LUTEIN (CENTRUM SILVER PO) Take  by mouth daily.  ASPIRIN PO Take 81 mg by mouth daily. No current facility-administered medications for this visit. Allergies   Allergen Reactions    Augmentin [Amoxicillin-Pot Clavulanate] Rash    Medrol [Methylprednisolone] Unknown (comments)     nervous    Sulfa (Sulfonamide Antibiotics) Unknown (comments)     REVIEW OF SYSTEMS: colo 6/12 Dr Sheridan Points   Ophtho - no vision change or eye pain  Oral - no mouth pain, tongue or tooth problems  Ears - no hearing loss, ear pain, fullness, no swallowing problems  Cardiac - no CP, PND, orthopnea, edema, palpitations or syncope  Chest - no breast masses  Resp - no wheezing, chronic coughing, dyspnea  GI - no heartburn, nausea, vomiting, change in bowel habits, bleeding, hemorrhoids  Urinary - no dysuria, hematuria, flank pain, urgency, frequency    Visit Vitals  /76   Pulse 82   Temp 97 °F (36.1 °C) (Temporal)   Resp 20   Ht 5' 11\" (1.803 m)   Wt 189 lb (85.7 kg)   SpO2 94%   BMI 26.36 kg/m²     A&O x3  Affect is appropriate. Mood stable  No apparent distress  Anicteric, no JVD, adenopathy or thyromegaly.    No carotid bruits or radiated murmur  Lungs clear to auscultation, no wheezes or rales  Heart showed regular rate and rhythm. No murmur, rubs, gallops  Abdomen soft nontender, no hepatosplenomegaly or masses.    Ext without c/c/e, 1-2+ dp and pt pulses right; venous stasisi changes bilat     LABS  From 10/11 showed          chol 230, tg 153, hdl 36, ldl-c 163  From 2/14 showed   gluc 126, cr 0.97, gfr>60,    hba1c 6.6, umar 5.9,  chol 175, tg 196, hdl 33, ldl-c 103, wbc 4.9, hb 14.9, plt 197, tsh 2.35, psa 0.50  From 2/15 showed   gluc 106, cr 1.00, gfr>60, alt 21, hba1c 6.8,    chol 146, tg 98,   hdl 38, ldl-c 88  From 8/15 showed   gluc 109, cr 0.87, gfr>60, alt 21, hba1c 6.1,    chol 174, tg 120, hdl 39, ldl-c 111  From 8/16 showed   gluc 101, cr 0.73, gfr>60, alt 38, hba1c 6.1,   chol 134, tg 152, hdl 39, ldl-c 65,   wbc 3.7, hb 13.7, plt 196, tsh 1.40, ua neg  From 8/17 showed   gluc 97,   cr 0.81, gfr>60, alt 39,     chol 157, tg 184, hdl 39, ldl-c 81,   wbc 4.2, hb 13.9, plt 219,       psa 0.3  From 3/18 showed   gluc 107, cr 0.72, gfr>60, alt 38,     chol 155, tg 128, hdl 41, ldl-c 88  From 9/18 showed   gluc 114, cr 0.71, gfr>60, alt 39,     chol 145, tg 90,   hdl 43, ldl-c 84,   wbc 3.6, hb 13.8, plt 193  From 3/19 showed   gluc 107, cr 0.76, gfr>60, alt 39,     chol 179, tg 165, hdl 43, ldl-c 103,             psa 0.3  From 8/19 showed   gluc 101, cr 0.84, gfr>60, alt 37,     chol 152, tg 92,   hdl 42, ldl-c 92,   wbc 4.7, hb 13.5, plt 207,      psa 0.3  From 3/20 showed   gluc 110, cr 0.76, gfr>60, alt 40,     chol 170, tg 93,   hdl 46, ldl-c 105  From 10/20 showed gluc 110, cr 0.79, gfr>60, alt 41, hba1c 6.4,    chol 160, tg 138, hdl 41, ldl-c 94,   wbc 4.0, hb 14.1,p lt 215,       psa 0.3  From 4/21 showed        hba1c 6.1, umar 6,     chol 164, tg 131, hdl 40, ldl-c 98  From 10/21 showed gluc 110, cr 0.81, gfr>60, alt 36, hba1c 5.8, umar neg,             wbc 4.2, hb 13.3, plt 185,       psa 0.4    Results for orders placed or performed during the hospital encounter of 04/22/22   LIPID PANEL   Result Value Ref Range    LIPID PROFILE          Cholesterol, total 172 <200 MG/DL    Triglyceride 133 <150 MG/DL    HDL Cholesterol 40 40 - 60 MG/DL    LDL, calculated 105.4 (H) 0 - 100 MG/DL    VLDL, calculated 26.6 MG/DL    CHOL/HDL Ratio 4.3 0 - 5.0     HEMOGLOBIN A1C WITH EAG   Result Value Ref Range    Hemoglobin A1c 6.1 (H) 4.2 - 5.6 %    Est. average glucose 128 mg/dL     We reviewed the patient's labs from the last several visits to point out trends in the numbers        Patient Active Problem List   Diagnosis Code    Sarcoidosis D86.9    Type 2 diabetes mellitus ([facility] [address] E11.9    Dyslipidemia E78.5    GERD (gastroesophageal reflux disease) K21.9    Allergic rhinitis J30.9    Overweight with body mass index (BMI) of 25 to 25.9 in adult E66.3, Z68.25    Pulmonary nodules R91.8     Assessment and plan:  1. Sarcoidosis. Inactive  2. DM. Controlled on dietary mgmt. F/u ophth  3. Allergies. Continue current regimen. 4. GERD. PPI and avoidance measures    NEW ISSUES  5. Dyslipidemia. Inc liptor to [de-identified] w comm screen findings  6. Lung ca screening. LDCT follow up in May and he will contact radiology as order in place  7. Dizziness. Call if recurrent, we discussed likely inner ear dysfunction  8. Colon screening ordered    RTC 10/22      Above conditions discussed at length and patient vocalized understanding. All questions answered to patient satisfaction        ICD-10-CM ICD-9-CM    1. Gastroesophageal reflux disease without esophagitis  K21.9 530.81    2. Type 2 diabetes mellitus without complication, without long-term current use of insulin (HCC)  E11.9 250.00 CBC W/O DIFF      METABOLIC PANEL, COMPREHENSIVE      LIPID PANEL      MICROALBUMIN, UR, RAND W/ MICROALB/CREAT RATIO      HEMOGLOBIN A1C WITH EAG   3. Dyslipidemia  E78.5 272.4 LIPID PANEL   4. Dizziness  R42 780.4    5.  Screen for colon cancer  Z12.11 V76.51 REFERRAL TO GASTROENTEROLOGY

## 2022-04-28 ENCOUNTER — OFFICE VISIT (OUTPATIENT)
Dept: INTERNAL MEDICINE CLINIC | Age: 72
End: 2022-04-28
Payer: COMMERCIAL

## 2022-04-28 VITALS
HEART RATE: 82 BPM | DIASTOLIC BLOOD PRESSURE: 76 MMHG | OXYGEN SATURATION: 94 % | WEIGHT: 189 LBS | BODY MASS INDEX: 26.46 KG/M2 | RESPIRATION RATE: 20 BRPM | HEIGHT: 71 IN | SYSTOLIC BLOOD PRESSURE: 132 MMHG | TEMPERATURE: 97 F

## 2022-04-28 DIAGNOSIS — Z12.11 SCREEN FOR COLON CANCER: ICD-10-CM

## 2022-04-28 DIAGNOSIS — K21.9 GASTROESOPHAGEAL REFLUX DISEASE WITHOUT ESOPHAGITIS: Primary | ICD-10-CM

## 2022-04-28 DIAGNOSIS — R42 DIZZINESS: ICD-10-CM

## 2022-04-28 DIAGNOSIS — E78.5 DYSLIPIDEMIA: ICD-10-CM

## 2022-04-28 DIAGNOSIS — E11.9 TYPE 2 DIABETES MELLITUS WITHOUT COMPLICATION, WITHOUT LONG-TERM CURRENT USE OF INSULIN (HCC): ICD-10-CM

## 2022-04-28 PROBLEM — R91.8 PULMONARY NODULES: Status: ACTIVE | Noted: 2021-11-01

## 2022-04-28 PROCEDURE — 1101F PT FALLS ASSESS-DOCD LE1/YR: CPT | Performed by: INTERNAL MEDICINE

## 2022-04-28 PROCEDURE — G8419 CALC BMI OUT NRM PARAM NOF/U: HCPCS | Performed by: INTERNAL MEDICINE

## 2022-04-28 PROCEDURE — G8536 NO DOC ELDER MAL SCRN: HCPCS | Performed by: INTERNAL MEDICINE

## 2022-04-28 PROCEDURE — G8427 DOCREV CUR MEDS BY ELIG CLIN: HCPCS | Performed by: INTERNAL MEDICINE

## 2022-04-28 PROCEDURE — 3017F COLORECTAL CA SCREEN DOC REV: CPT | Performed by: INTERNAL MEDICINE

## 2022-04-28 PROCEDURE — 3044F HG A1C LEVEL LT 7.0%: CPT | Performed by: INTERNAL MEDICINE

## 2022-04-28 PROCEDURE — G8510 SCR DEP NEG, NO PLAN REQD: HCPCS | Performed by: INTERNAL MEDICINE

## 2022-04-28 PROCEDURE — 99214 OFFICE O/P EST MOD 30 MIN: CPT | Performed by: INTERNAL MEDICINE

## 2022-04-28 PROCEDURE — 2022F DILAT RTA XM EVC RTNOPTHY: CPT | Performed by: INTERNAL MEDICINE

## 2022-04-28 RX ORDER — ATORVASTATIN CALCIUM 80 MG/1
80 TABLET, FILM COATED ORAL DAILY
Qty: 90 TABLET | Refills: 3 | Status: SHIPPED | OUTPATIENT
Start: 2022-04-28

## 2022-04-28 RX ORDER — PANTOPRAZOLE SODIUM 40 MG/1
40 TABLET, DELAYED RELEASE ORAL DAILY
Qty: 90 TABLET | Refills: 3 | Status: SHIPPED | OUTPATIENT
Start: 2022-04-28 | End: 2022-08-02

## 2022-04-28 RX ORDER — ATORVASTATIN CALCIUM 40 MG/1
TABLET, FILM COATED ORAL
Qty: 90 TABLET | Refills: 3 | Status: CANCELLED | OUTPATIENT
Start: 2022-04-28

## 2022-04-28 RX ORDER — FLUTICASONE PROPIONATE 50 MCG
2 SPRAY, SUSPENSION (ML) NASAL DAILY
Qty: 48 G | Refills: 3 | Status: SHIPPED | OUTPATIENT
Start: 2022-04-28

## 2022-04-28 NOTE — PROGRESS NOTES
Adriana Gardner presents with   Chief Complaint   Patient presents with    Follow-up     6 month    Cholesterol Problem    Diabetes    Labs     4-22-22                1. \"Have you been to the ER, urgent care clinic since your last visit? Hospitalized since your last visit? \" No    2. \"Have you seen or consulted any other health care providers outside of the [address] [place] [address] since your last visit? \" No     3. For patients aged 39-70: Has the patient had a colonoscopy / FIT/ Cologuard?  Yes - no Care Gap present

## 2022-05-11 ENCOUNTER — HOSPITAL ENCOUNTER (OUTPATIENT)
Dept: CT IMAGING | Age: 72
Discharge: HOME | End: 2022-05-11
Attending: INTERNAL MEDICINE
Payer: COMMERCIAL

## 2022-05-11 DIAGNOSIS — R91.8 PULMONARY NODULES: ICD-10-CM

## 2022-05-11 PROCEDURE — 71250 CT THORAX DX C-: CPT

## 2022-05-19 ENCOUNTER — TELEPHONE (OUTPATIENT)
Dept: INTERNAL MEDICINE CLINIC | Age: 72
End: 2022-05-19

## 2022-05-19 DIAGNOSIS — M19.90 ARTHRITIS: Primary | ICD-10-CM

## 2022-05-19 RX ORDER — HYDROCODONE BITARTRATE AND ACETAMINOPHEN 5; 325 MG/1; MG/1
1 TABLET ORAL
Qty: 30 TABLET | Refills: 0 | Status: SHIPPED | OUTPATIENT
Start: 2022-05-19 | End: 2022-05-29

## 2022-05-19 NOTE — TELEPHONE ENCOUNTER
[facility] as 11[facility]1/21 for a 10 d/s. Last Visit: 4/28/22 with MD Praful Morales  Next Appointment: 11/9/22 with MD Praful Morales  Previous Refill Encounter(s): 11/1/21 #30    Requested Prescriptions     Pending Prescriptions Disp Refills    HYDROcodone-acetaminophen (NORCO) 5-325 mg per tablet 30 Tablet 0     Sig: Take 1 Tablet by mouth every eight (8) hours as needed for Pain for up to 10 days. Max Daily Amount: 3 Tablets.

## 2022-05-19 NOTE — TELEPHONE ENCOUNTER
pls call    IMPRESSION     Several pulmonary nodules are stable. Recommend patient return to routine yearly  lung cancer screening in 12 months.     Follow up Low dose CT 12 mos

## 2022-08-02 RX ORDER — PANTOPRAZOLE SODIUM 40 MG/1
TABLET, DELAYED RELEASE ORAL
Qty: 90 TABLET | Refills: 3 | Status: SHIPPED | OUTPATIENT
Start: 2022-08-02

## 2022-09-14 ENCOUNTER — TELEPHONE (OUTPATIENT)
Dept: INTERNAL MEDICINE CLINIC | Age: 72
End: 2022-09-14

## 2022-11-02 ENCOUNTER — HOSPITAL ENCOUNTER (OUTPATIENT)
Dept: LAB | Age: 72
Discharge: HOME | End: 2022-11-02
Payer: COMMERCIAL

## 2022-11-02 ENCOUNTER — APPOINTMENT (OUTPATIENT)
Dept: INTERNAL MEDICINE CLINIC | Age: 72
End: 2022-11-02

## 2022-11-02 DIAGNOSIS — E78.5 DYSLIPIDEMIA: ICD-10-CM

## 2022-11-02 DIAGNOSIS — E11.9 TYPE 2 DIABETES MELLITUS WITHOUT COMPLICATION, WITHOUT LONG-TERM CURRENT USE OF INSULIN (HCC): ICD-10-CM

## 2022-11-02 LAB
ALBUMIN SERPL-MCNC: 3.7 G/DL (ref 3.4–5)
ALBUMIN/GLOB SERPL: 1.1 {RATIO} (ref 0.8–1.7)
ALP SERPL-CCNC: 78 U/L (ref 45–117)
ALT SERPL-CCNC: 37 U/L (ref 16–61)
ANION GAP SERPL CALC-SCNC: 8 MMOL/L (ref 3–18)
AST SERPL-CCNC: 23 U/L (ref 10–38)
BILIRUB SERPL-MCNC: 0.5 MG/DL (ref 0.2–1)
BUN SERPL-MCNC: 21 MG/DL (ref 7–18)
BUN/CREAT SERPL: 28 (ref 12–20)
CALCIUM SERPL-MCNC: 9.4 MG/DL (ref 8.5–10.1)
CHLORIDE SERPL-SCNC: 106 MMOL/L (ref 100–111)
CHOLEST SERPL-MCNC: 171 MG/DL
CO2 SERPL-SCNC: 26 MMOL/L (ref 21–32)
CREAT SERPL-MCNC: 0.75 MG/DL (ref 0.6–1.3)
CREAT UR-MCNC: 24 MG/DL (ref 30–125)
ERYTHROCYTE [DISTWIDTH] IN BLOOD BY AUTOMATED COUNT: 14.1 % (ref 11.6–14.5)
EST. AVERAGE GLUCOSE BLD GHB EST-MCNC: 128 MG/DL
GLOBULIN SER CALC-MCNC: 3.4 G/DL (ref 2–4)
GLUCOSE SERPL-MCNC: 104 MG/DL (ref 74–99)
HBA1C MFR BLD: 6.1 % (ref 4.2–5.6)
HCT VFR BLD AUTO: 41.7 % (ref 36–48)
HDLC SERPL-MCNC: 46 MG/DL (ref 40–60)
HDLC SERPL: 3.7 {RATIO} (ref 0–5)
HGB BLD-MCNC: 13.5 G/DL (ref 13–16)
LDLC SERPL CALC-MCNC: 98.8 MG/DL (ref 0–100)
LIPID PROFILE,FLP: NORMAL
MCH RBC QN AUTO: 28.1 PG (ref 24–34)
MCHC RBC AUTO-ENTMCNC: 32.4 G/DL (ref 31–37)
MCV RBC AUTO: 86.7 FL (ref 78–100)
MICROALBUMIN UR-MCNC: <0.5 MG/DL (ref 0–3)
MICROALBUMIN/CREAT UR-RTO: ABNORMAL MG/G (ref 0–30)
NRBC # BLD: 0 K/UL (ref 0–0.01)
NRBC BLD-RTO: 0 PER 100 WBC
PLATELET # BLD AUTO: 203 K/UL (ref 135–420)
PMV BLD AUTO: 10.3 FL (ref 9.2–11.8)
POTASSIUM SERPL-SCNC: 4.3 MMOL/L (ref 3.5–5.5)
PROT SERPL-MCNC: 7.1 G/DL (ref 6.4–8.2)
RBC # BLD AUTO: 4.81 M/UL (ref 4.35–5.65)
SODIUM SERPL-SCNC: 140 MMOL/L (ref 136–145)
TRIGL SERPL-MCNC: 131 MG/DL (ref ?–150)
VLDLC SERPL CALC-MCNC: 26.2 MG/DL
WBC # BLD AUTO: 4.4 K/UL (ref 4.6–13.2)

## 2022-11-02 PROCEDURE — 80053 COMPREHEN METABOLIC PANEL: CPT

## 2022-11-02 PROCEDURE — 36415 COLL VENOUS BLD VENIPUNCTURE: CPT

## 2022-11-02 PROCEDURE — 82043 UR ALBUMIN QUANTITATIVE: CPT

## 2022-11-02 PROCEDURE — 80061 LIPID PANEL: CPT

## 2022-11-02 PROCEDURE — 85027 COMPLETE CBC AUTOMATED: CPT

## 2022-11-02 PROCEDURE — 83036 HEMOGLOBIN GLYCOSYLATED A1C: CPT

## 2022-11-04 NOTE — PROGRESS NOTES
67 y.o. WHITE/NON- male who presents for evaluation. No cardiovascular complaints. No set exercise but he remains active mainly with work and chores. His follow up CT thorax showed unchanged nodules so back down to yearly follow up per radiology    No gi or gu issues. He did not schedule the colo as we had discussed but will do so in the near future    Denies polyuria, polydipsia, nocturia, vision change.  Weight stable    LAST MEDICARE WELLNESS EXAM: never as not medicare b     Past Medical History:   Diagnosis Date    Allergic rhinitis     Carotid disease, bilateral (HCC)     comm screen mild BICA, AAA neg, RABI 1.00/LABI 1.06 (11/21)    COVID-19 vaccine dose declined 11/2022    boosters    DM (diabetes mellitus) ([facility] [address] 2014    on basis of hba1c    Dyslipidemia     GERD (gastroesophageal reflux disease)     H/O cardiac catheterization 1998    negative Dr Belem Melendez for false positive stress test    Hearing loss 1996    LEFT; Dr Piotr Sanches s/p tympanoplasty, atticotomy, temporofascial graft for cholesteotoma    Overweight     Pulmonary nodules 11/2021    LDCT showed mult <6mm bilat nodules, mild bronchiectatic changes, gallstones, coronary calcifications (11/21); no change (5/22)    Fredi Gonzalez on bx    SCC (squamous cell carcinoma)     Dr Bryce Jacome; face    Venous stasis     Vocal cord paralysis 12/2004    RIGHT     Past Surgical History:   Procedure Laterality Date    HX CARPAL TUNNEL RELEASE Bilateral 1993    Dr Jessika Diaz CATARACT REMOVAL Bilateral 2009    Dr Lisseth Merchant 6/11/12 neg    HX HEENT      LEFT ear surgery Dr Piotr Sanches 5/96; LEFT ear tympanoplasty Dr Khari Tubbs 5/06    [facility] repair    HX ROTATOR CUFF REPAIR Right     6/20 Dr Ronna Goel; redo 7/20; redo 12/02 Dr Sales Mail TONSILLECTOMY  07/1981     Social History     Socioeconomic History    Marital status:      Spouse name: Not on file    Number of children: 0 Years of education: Not on file    Highest education level: Not on file   Occupational History    Occupation: retired  [employer]; works [employer]   Tobacco Use    Smoking status: Former     Packs/day: 5.00     Years: 15.00     Pack years: 75.00     Types: Cigarettes, Pipe, Cigars     Quit date: 1980     Years since quittin.8    Smokeless tobacco: Never   Substance and Sexual Activity    Alcohol use: No    Drug use: No    Sexual activity: Not on file   Other Topics Concern    Not on file   Social History Narrative    Not on file     Social Determinants of Health     Financial Resource Strain: Not on file   Food Insecurity: Not on file   Transportation Needs: Not on file   Physical Activity: Not on file   Stress: Not on file   Social Connections: Not on file   Intimate Partner Violence: Not on file   Housing Stability: Not on file     Current Outpatient Medications   Medication Sig    HYDROcodone-acetaminophen (NORCO) 5-325 mg per tablet Take 1 Tablet by mouth every eight (8) hours as needed for Pain for up to 10 days. Max Daily Amount: 3 Tablets. HYDROcodone-acetaminophen (NORCO) 5-325 mg per tablet Take  by mouth.    ezetimibe (ZETIA) 10 mg tablet Take 1 Tablet by mouth daily. pantoprazole (PROTONIX) 40 mg tablet TAKE 1 TABLET DAILY    fluticasone propionate (Flonase) 50 mcg/actuation nasal spray 2 Sprays by Both Nostrils route daily. atorvastatin (LIPITOR) 80 mg tablet Take 1 Tablet by mouth daily. glucose blood VI test strips (OneTouch Verio test strips) strip USE AS DIRECTED TO CHECK   BLOOD SUGARS ONCE DAILY. Blood-Glucose Meter monitoring kit Use as directed to check blood sugars once daily. Please dispense brand covered by insurance. Dx: E11.9.    lancets misc Use as directed to check blood sugars once daily. Please dispense brand covered by insurance. Dx: E11.9.    magnesium 250 mg tab Take 250 mg by mouth daily.     FOLIC ACID/MULTIVIT-MIN/LUTEIN (CENTRUM SILVER PO) Take  by mouth daily. ASPIRIN PO Take 81 mg by mouth daily. No current facility-administered medications for this visit. Allergies   Allergen Reactions    Augmentin [Amoxicillin-Pot Clavulanate] Rash    Medrol [Methylprednisolone] Unknown (comments)     nervous    Sulfa (Sulfonamide Antibiotics) Unknown (comments)     REVIEW OF SYSTEMS: colo 6/12 Dr Audelia Sánchez   Ophtho - no vision change or eye pain  Oral - no mouth pain, tongue or tooth problems  Ears - no hearing loss, ear pain, fullness, no swallowing problems  Cardiac - no CP, PND, orthopnea, edema, palpitations or syncope  Chest - no breast masses  Resp - no wheezing, chronic coughing, dyspnea  GI - no heartburn, nausea, vomiting, change in bowel habits, bleeding, hemorrhoids  Urinary - no dysuria, hematuria, flank pain, urgency, frequency    Visit Vitals  /71   Pulse 76   Temp 97.3 °F (36.3 °C) (Temporal)   Resp 17   Ht 5' 11\" (1.803 m)   Wt 181 lb (82.1 kg)   SpO2 96%   BMI 25.24 kg/m²     A&O x3  Affect is appropriate. Mood stable  No apparent distress  Anicteric, no JVD, adenopathy or thyromegaly. No carotid bruits or radiated murmur  Lungs clear to auscultation, no wheezes or rales  Heart showed regular rate and rhythm. No murmur, rubs, gallops  Abdomen soft nontender, no hepatosplenomegaly or masses.    Ext without c/c/e, 1-2+ dp and pt pulses right; venous stasisi changes bilat     LABS  From 10/11 showed           chol 230, tg 153, hdl 36, ldl-c 163  From 2/14 showed   gluc 126, cr 0.97, gfr>60,    hba1c 6.6, umar 5.9,  chol 175, tg 196, hdl 33, ldl-c 103, wbc 4.9, hb 14.9, plt 197, tsh 2.35, psa 0.50  From 2/15 showed   gluc 106, cr 1.00, gfr>60, alt 21, hba1c 6.8,     chol 146, tg 98,   hdl 38, ldl-c 88  From 8/15 showed   gluc 109, cr 0.87, gfr>60, alt 21, hba1c 6.1,     chol 174, tg 120, hdl 39, ldl-c 111  From 8/16 showed   gluc 101, cr 0.73, gfr>60, alt 38, hba1c 6.1,    chol 134, tg 152, hdl 39, ldl-c 65,   wbc 3.7, hb 13.7, plt 196, tsh 1.40, ua neg  From 8/17 showed   gluc 97,   cr 0.81, gfr>60, alt 39,      chol 157, tg 184, hdl 39, ldl-c 81,   wbc 4.2, hb 13.9, plt 219,         psa 0.3  From 3/18 showed   gluc 107, cr 0.72, gfr>60, alt 38,      chol 155, tg 128, hdl 41, ldl-c 88  From 9/18 showed   gluc 114, cr 0.71, gfr>60, alt 39,      chol 145, tg 90,   hdl 43, ldl-c 84,   wbc 3.6, hb 13.8, plt 193  From 3/19 showed   gluc 107, cr 0.76, gfr>60, alt 39,      chol 179, tg 165, hdl 43, ldl-c 103,               psa 0.3  From 8/19 showed   gluc 101, cr 0.84, gfr>60, alt 37,      chol 152, tg 92,   hdl 42, ldl-c 92,   wbc 4.7, hb 13.5, plt 207,        psa 0.3  From 3/20 showed   gluc 110, cr 0.76, gfr>60, alt 40,      chol 170, tg 93,   hdl 46, ldl-c 105  From 10/20 showed gluc 110, cr 0.79, gfr>60, alt 41, hba1c 6.4,     chol 160, tg 138, hdl 41, ldl-c 94,   wbc 4.0, hb 14.1,p lt 215,         psa 0.3  From 4/21 showed         hba1c 6.1, umar 6,     chol 164, tg 131, hdl 40, ldl-c 98  From 10/21 showed gluc 110, cr 0.81, gfr>60, alt 36, hba1c 5.8, umar neg,               wbc 4.2, hb 13.3, plt 185,         psa 0.4  From 4/22 showed         hba1c 6.1,     chol 172, tg 133, hdl 40, ldl-c 105    Results for orders placed or performed during the hospital encounter of 11/02/22   CBC W/O DIFF   Result Value Ref Range    WBC 4.4 (L) 4.6 - 13.2 K/uL    RBC 4.81 4.35 - 5.65 M/uL    HGB 13.5 13.0 - 16.0 g/dL    HCT 41.7 36.0 - 48.0 %    MCV 86.7 78.0 - 100.0 FL    MCH 28.1 24.0 - 34.0 PG    MCHC 32.4 31.0 - 37.0 g/dL    RDW 14.1 11.6 - 14.5 %    PLATELET 319 402 - 621 K/uL    MPV 10.3 9.2 - 11.8 FL    NRBC 0.0 0  WBC    ABSOLUTE NRBC 0.00 0.00 - 4.17 K/uL   METABOLIC PANEL, COMPREHENSIVE   Result Value Ref Range    Sodium 140 136 - 145 mmol/L    Potassium 4.3 3.5 - 5.5 mmol/L    Chloride 106 100 - 111 mmol/L    CO2 26 21 - 32 mmol/L    Anion gap 8 3.0 - 18 mmol/L    Glucose 104 (H) 74 - 99 mg/dL    BUN 21 (H) 7.0 - 18 MG/DL    Creatinine 0. 75 0.6 - 1.3 MG/DL    BUN/Creatinine ratio 28 (H) 12 - 20      eGFR >60 >60 ml/min/1.73m2    Calcium 9.4 8.5 - 10.1 MG/DL    Bilirubin, total 0.5 0.2 - 1.0 MG/DL    ALT (SGPT) 37 16 - 61 U/L    AST (SGOT) 23 10 - 38 U/L    Alk. phosphatase 78 45 - 117 U/L    Protein, total 7.1 6.4 - 8.2 g/dL    Albumin 3.7 3.4 - 5.0 g/dL    Globulin 3.4 2.0 - 4.0 g/dL    A-G Ratio 1.1 0.8 - 1.7     LIPID PANEL   Result Value Ref Range    LIPID PROFILE          Cholesterol, total 171 <200 MG/DL    Triglyceride 131 <150 MG/DL    HDL Cholesterol 46 40 - 60 MG/DL    LDL, calculated 98.8 0 - 100 MG/DL    VLDL, calculated 26.2 MG/DL    CHOL/HDL Ratio 3.7 0 - 5.0     MICROALBUMIN, UR, RAND W/ MICROALB/CREAT RATIO   Result Value Ref Range    Microalbumin,urine random <0.50 0 - 3.0 MG/DL    Creatinine, urine random 24.00 (L) 30 - 125 mg/dL    Microalbumin/Creat ratio (mg/g creat)  0 - 30 mg/g     Cannot calculate ratio due to microalbumin result outside reportable range. HEMOGLOBIN A1C WITH EAG   Result Value Ref Range    Hemoglobin A1c 6.1 (H) 4.2 - 5.6 %    Est. average glucose 128 mg/dL     We reviewed the patient's labs from the last several visits to point out trends in the numbers        Patient Active Problem List   Diagnosis Code    Sarcoidosis D86.9    Type 2 diabetes mellitus (HCC) E11.9    Dyslipidemia E78.5    GERD (gastroesophageal reflux disease) K21.9    Allergic rhinitis J30.9    Overweight with body mass index (BMI) of 25 to 25.9 in adult E66.3, Z68.25    Pulmonary nodules R91.8    Opioid use, unspecified with unspecified opioid-induced disorder F11.99     Assessment and plan:  1. Sarcoidosis. Inactive  2. DM. Controlled on dietary mgmt. F/u ophth  3. Allergies. Continue current regimen. 4. GERD. PPI and avoidance measures  5. Dyslipidemia. Will add zetia after discussing possible risks and benefits to get ldl closer to 70  6. Lung nodules. CT 2023  7. Colon screening to be scheduled by pt  8.  Covid booster declined, he took the 1st shingrix so encouraged him to take the 2nd    RTC 5/23      Above conditions discussed at length and patient vocalized understanding. All questions answered to patient satisfaction        ICD-10-CM ICD-9-CM    1. Type 2 diabetes mellitus without complication, without long-term current use of insulin (HCC)  E11.9 250.00 LIPID PANEL      HEMOGLOBIN A1C WITH EAG      2. Arthritis  M19.90 716.90 HYDROcodone-acetaminophen (NORCO) 5-325 mg per tablet      3. Opioid use, unspecified with unspecified opioid-induced disorder  F11.99 292.9      305.50       4. Pulmonary nodules  R91.8 793.19       5.  Dyslipidemia  E78.5 272.4 HEPATIC FUNCTION PANEL      LIPID PANEL

## 2022-11-08 NOTE — PROGRESS NOTES
Kentrell Karen presents today for   Chief Complaint   Patient presents with    Follow-up     GERD w/out esophagitis   Type 2 diabetes   Dyslipidemia     Labs     11-2-22       1. \"Have you been to the ER, urgent care clinic since your last visit? Hospitalized since your last visit? \" no    2. \"Have you seen or consulted any other health care providers outside of the [address] [place] [address] since your last visit? \" no     3. For patients aged 39-70: Has the patient had a colonoscopy / FIT/ Cologuard? No      If the patient is female:    4. For patients aged 41-77: Has the patient had a mammogram within the past 2 years? NA - based on age or sex  See top three    5. For patients aged 21-65: Has the patient had a pap smear?  NA - based on age or sex

## 2022-11-09 ENCOUNTER — OFFICE VISIT (OUTPATIENT)
Dept: INTERNAL MEDICINE CLINIC | Age: 72
End: 2022-11-09
Payer: COMMERCIAL

## 2022-11-09 VITALS
HEART RATE: 76 BPM | RESPIRATION RATE: 17 BRPM | TEMPERATURE: 97.3 F | HEIGHT: 71 IN | WEIGHT: 181 LBS | SYSTOLIC BLOOD PRESSURE: 132 MMHG | OXYGEN SATURATION: 96 % | BODY MASS INDEX: 25.34 KG/M2 | DIASTOLIC BLOOD PRESSURE: 71 MMHG

## 2022-11-09 DIAGNOSIS — F11.99 OPIOID USE, UNSPECIFIED WITH UNSPECIFIED OPIOID-INDUCED DISORDER (HCC): ICD-10-CM

## 2022-11-09 DIAGNOSIS — M19.90 ARTHRITIS: ICD-10-CM

## 2022-11-09 DIAGNOSIS — R91.8 PULMONARY NODULES: ICD-10-CM

## 2022-11-09 DIAGNOSIS — E78.5 DYSLIPIDEMIA: ICD-10-CM

## 2022-11-09 DIAGNOSIS — E11.9 TYPE 2 DIABETES MELLITUS WITHOUT COMPLICATION, WITHOUT LONG-TERM CURRENT USE OF INSULIN (HCC): Primary | ICD-10-CM

## 2022-11-09 PROCEDURE — 1123F ACP DISCUSS/DSCN MKR DOCD: CPT | Performed by: INTERNAL MEDICINE

## 2022-11-09 PROCEDURE — 99214 OFFICE O/P EST MOD 30 MIN: CPT | Performed by: INTERNAL MEDICINE

## 2022-11-09 PROCEDURE — G8427 DOCREV CUR MEDS BY ELIG CLIN: HCPCS | Performed by: INTERNAL MEDICINE

## 2022-11-09 PROCEDURE — G8536 NO DOC ELDER MAL SCRN: HCPCS | Performed by: INTERNAL MEDICINE

## 2022-11-09 PROCEDURE — 1101F PT FALLS ASSESS-DOCD LE1/YR: CPT | Performed by: INTERNAL MEDICINE

## 2022-11-09 PROCEDURE — G8510 SCR DEP NEG, NO PLAN REQD: HCPCS | Performed by: INTERNAL MEDICINE

## 2022-11-09 PROCEDURE — 3044F HG A1C LEVEL LT 7.0%: CPT | Performed by: INTERNAL MEDICINE

## 2022-11-09 PROCEDURE — 2022F DILAT RTA XM EVC RTNOPTHY: CPT | Performed by: INTERNAL MEDICINE

## 2022-11-09 PROCEDURE — G8417 CALC BMI ABV UP PARAM F/U: HCPCS | Performed by: INTERNAL MEDICINE

## 2022-11-09 PROCEDURE — 3017F COLORECTAL CA SCREEN DOC REV: CPT | Performed by: INTERNAL MEDICINE

## 2022-11-09 RX ORDER — HYDROCODONE BITARTRATE AND ACETAMINOPHEN 5; 325 MG/1; MG/1
TABLET ORAL
COMMUNITY

## 2022-11-09 RX ORDER — HYDROCODONE BITARTRATE AND ACETAMINOPHEN 5; 325 MG/1; MG/1
1 TABLET ORAL
Qty: 30 TABLET | Refills: 0 | Status: SHIPPED | OUTPATIENT
Start: 2022-11-09 | End: 2022-11-19

## 2022-11-09 RX ORDER — EZETIMIBE 10 MG/1
10 TABLET ORAL DAILY
Qty: 90 TABLET | Refills: 1 | Status: SHIPPED | OUTPATIENT
Start: 2022-11-09 | End: 2022-11-14 | Stop reason: SDUPTHER

## 2022-11-14 RX ORDER — ATORVASTATIN CALCIUM 80 MG/1
80 TABLET, FILM COATED ORAL DAILY
Qty: 90 TABLET | Refills: 3 | Status: SHIPPED | OUTPATIENT
Start: 2022-11-14

## 2022-11-14 RX ORDER — EZETIMIBE 10 MG/1
10 TABLET ORAL DAILY
Qty: 90 TABLET | Refills: 3 | Status: SHIPPED | OUTPATIENT
Start: 2022-11-14

## 2022-11-14 RX ORDER — PANTOPRAZOLE SODIUM 40 MG/1
TABLET, DELAYED RELEASE ORAL
Qty: 90 TABLET | Refills: 3 | Status: SHIPPED | OUTPATIENT
Start: 2022-11-14

## 2022-11-14 NOTE — TELEPHONE ENCOUNTER
Requested Prescriptions     Pending Prescriptions Disp Refills    atorvastatin (LIPITOR) 80 mg tablet 90 Tablet 3     Sig: Take 1 Tablet by mouth daily. pantoprazole (PROTONIX) 40 mg tablet 90 Tablet 3     Si Tablet daily. ezetimibe (ZETIA) 10 mg tablet 90 Tablet 1     Sig: Take 1 Tablet by mouth daily. Pt needs these medication sent to his mail pharmacy [facility]. Stated he does not use the local pharmacy for these.  There are refills at his local and Zetia went there last week, but he wants them sent to [facility]

## 2023-02-04 DIAGNOSIS — E11.9 TYPE 2 DIABETES MELLITUS WITHOUT COMPLICATION, WITHOUT LONG-TERM CURRENT USE OF INSULIN (HCC): Primary | ICD-10-CM

## 2023-02-04 DIAGNOSIS — E78.5 DYSLIPIDEMIA: ICD-10-CM

## 2023-02-05 DIAGNOSIS — E11.9 TYPE 2 DIABETES MELLITUS WITHOUT COMPLICATION, WITHOUT LONG-TERM CURRENT USE OF INSULIN (HCC): Primary | ICD-10-CM

## 2023-02-06 DIAGNOSIS — E78.5 DYSLIPIDEMIA: Primary | ICD-10-CM

## 2023-05-11 ENCOUNTER — HOSPITAL ENCOUNTER (OUTPATIENT)
Facility: HOSPITAL | Age: 73
Setting detail: SPECIMEN
Discharge: HOME | End: 2023-05-11
Payer: MEDICARE

## 2023-05-11 DIAGNOSIS — E78.5 DYSLIPIDEMIA: ICD-10-CM

## 2023-05-11 DIAGNOSIS — E11.9 TYPE 2 DIABETES MELLITUS WITHOUT COMPLICATION, WITHOUT LONG-TERM CURRENT USE OF INSULIN (HCC): ICD-10-CM

## 2023-05-11 LAB
ALBUMIN SERPL-MCNC: 4 G/DL (ref 3.4–5)
ALBUMIN/GLOB SERPL: 1.2 (ref 0.8–1.7)
ALP SERPL-CCNC: 72 U/L (ref 45–117)
ALT SERPL-CCNC: 45 U/L (ref 16–61)
AST SERPL-CCNC: 26 U/L (ref 10–38)
BILIRUB DIRECT SERPL-MCNC: 0.2 MG/DL (ref 0–0.2)
BILIRUB SERPL-MCNC: 0.6 MG/DL (ref 0.2–1)
CHOLEST SERPL-MCNC: 142 MG/DL
EST. AVERAGE GLUCOSE BLD GHB EST-MCNC: 131 MG/DL
GLOBULIN SER CALC-MCNC: 3.4 G/DL (ref 2–4)
HBA1C MFR BLD: 6.2 % (ref 4.2–5.6)
HDLC SERPL-MCNC: 49 MG/DL (ref 40–60)
HDLC SERPL: 2.9 (ref 0–5)
LDLC SERPL CALC-MCNC: 70.2 MG/DL (ref 0–100)
LIPID PANEL: NORMAL
PROT SERPL-MCNC: 7.4 G/DL (ref 6.4–8.2)
TRIGL SERPL-MCNC: 114 MG/DL
VLDLC SERPL CALC-MCNC: 22.8 MG/DL

## 2023-05-11 PROCEDURE — 80076 HEPATIC FUNCTION PANEL: CPT

## 2023-05-11 PROCEDURE — 80061 LIPID PANEL: CPT

## 2023-05-11 PROCEDURE — 83036 HEMOGLOBIN GLYCOSYLATED A1C: CPT

## 2023-05-11 PROCEDURE — 36415 COLL VENOUS BLD VENIPUNCTURE: CPT

## 2023-05-15 NOTE — PROGRESS NOTES
Leah Mireles presents today for   Chief Complaint   Patient presents with    Discuss Labs     5-11-23    Diabetes    Hyperlipidemia    Annual Exam     1. \"Have you been to the ER, urgent care clinic since your last visit? Hospitalized since your last visit? \" Yes  4-10-23 @ [address], Colonoscopy     2. \"Have you seen or consulted any other health care providers outside of the [address] [place] [address] since your last visit? \" no     3. For patients aged 39-70: Has the patient had a colonoscopy / FIT/ Cologuard? Yes - no Care Gap present      If the patient is female:    4. For patients aged 41-77: Has the patient had a mammogram within the past 2 years? NA - based on age or sex      11. For patients aged 21-65: Has the patient had a pap smear?  NA - based on age or sex

## 2023-05-16 ENCOUNTER — OFFICE VISIT (OUTPATIENT)
Age: 73
End: 2023-05-16
Payer: MEDICARE

## 2023-05-16 VITALS
OXYGEN SATURATION: 98 % | HEART RATE: 80 BPM | RESPIRATION RATE: 19 BRPM | WEIGHT: 188 LBS | SYSTOLIC BLOOD PRESSURE: 136 MMHG | DIASTOLIC BLOOD PRESSURE: 73 MMHG | TEMPERATURE: 97.9 F | HEIGHT: 71 IN | BODY MASS INDEX: 26.32 KG/M2

## 2023-05-16 DIAGNOSIS — E78.5 DYSLIPIDEMIA: ICD-10-CM

## 2023-05-16 DIAGNOSIS — F11.99 OPIOID USE, UNSPECIFIED WITH UNSPECIFIED OPIOID-INDUCED DISORDER (HCC): ICD-10-CM

## 2023-05-16 DIAGNOSIS — R91.8 PULMONARY NODULES: ICD-10-CM

## 2023-05-16 DIAGNOSIS — D86.9 SARCOIDOSIS: ICD-10-CM

## 2023-05-16 DIAGNOSIS — K21.9 GASTROESOPHAGEAL REFLUX DISEASE WITHOUT ESOPHAGITIS: ICD-10-CM

## 2023-05-16 DIAGNOSIS — E11.9 TYPE 2 DIABETES MELLITUS WITHOUT COMPLICATION, WITHOUT LONG-TERM CURRENT USE OF INSULIN (HCC): Primary | ICD-10-CM

## 2023-05-16 PROCEDURE — 99211 OFF/OP EST MAY X REQ PHY/QHP: CPT | Performed by: INTERNAL MEDICINE

## 2023-05-16 PROCEDURE — 99397 PER PM REEVAL EST PAT 65+ YR: CPT | Performed by: INTERNAL MEDICINE

## 2023-05-16 RX ORDER — HYDROCODONE BITARTRATE AND ACETAMINOPHEN 5; 325 MG/1; MG/1
1 TABLET ORAL EVERY 8 HOURS PRN
Qty: 30 TABLET | Refills: 0 | Status: SHIPPED | OUTPATIENT
Start: 2023-05-16 | End: 2023-06-15

## 2023-05-16 SDOH — ECONOMIC STABILITY: HOUSING INSECURITY
IN THE LAST 12 MONTHS, WAS THERE A TIME WHEN YOU DID NOT HAVE A STEADY PLACE TO SLEEP OR SLEPT IN A SHELTER (INCLUDING NOW)?: NO

## 2023-05-16 SDOH — ECONOMIC STABILITY: FOOD INSECURITY: WITHIN THE PAST 12 MONTHS, YOU WORRIED THAT YOUR FOOD WOULD RUN OUT BEFORE YOU GOT MONEY TO BUY MORE.: NEVER TRUE

## 2023-05-16 SDOH — ECONOMIC STABILITY: FOOD INSECURITY: WITHIN THE PAST 12 MONTHS, THE FOOD YOU BOUGHT JUST DIDN'T LAST AND YOU DIDN'T HAVE MONEY TO GET MORE.: NEVER TRUE

## 2023-05-16 SDOH — ECONOMIC STABILITY: INCOME INSECURITY: HOW HARD IS IT FOR YOU TO PAY FOR THE VERY BASICS LIKE FOOD, HOUSING, MEDICAL CARE, AND HEATING?: NOT HARD AT ALL

## 2023-05-16 ASSESSMENT — LIFESTYLE VARIABLES
HOW OFTEN DO YOU HAVE A DRINK CONTAINING ALCOHOL: MONTHLY OR LESS
HOW MANY STANDARD DRINKS CONTAINING ALCOHOL DO YOU HAVE ON A TYPICAL DAY: 1 OR 2

## 2023-05-16 ASSESSMENT — PATIENT HEALTH QUESTIONNAIRE - PHQ9
1. LITTLE INTEREST OR PLEASURE IN DOING THINGS: 0
SUM OF ALL RESPONSES TO PHQ QUESTIONS 1-9: 0
SUM OF ALL RESPONSES TO PHQ9 QUESTIONS 1 & 2: 0
SUM OF ALL RESPONSES TO PHQ QUESTIONS 1-9: 0
2. FEELING DOWN, DEPRESSED OR HOPELESS: 0

## 2023-09-20 NOTE — TELEPHONE ENCOUNTER
PCP:  Justen Dumont MD    Last refill per chart:  fluticasone (FLONASE) 50 MCG/ACT nasal spray 2 spray, Nasal, DAILY Edit       Summary: 2 sprays by Nasal route daily   Dose, Frequency: 2 spray, DAILY  Start: 2022  Ord/Sold: 2023 (O)  Report  Taking:   Long-term:   Med Dose History       Patient Si sprays by Nasal route daily       Ordered on: 2023       Authorized by: [employer]          Future Appointments   Date Time Provider [address] [place] [address]   2023  8:00 AM [facility] LAB VISIT [facility] BS AMB   2023  8:20 AM Justen Dumont MD [facility] BS AMB

## 2023-09-25 RX ORDER — FLUTICASONE PROPIONATE 50 MCG
2 SPRAY, SUSPENSION (ML) NASAL DAILY
Qty: 48 G | Refills: 3 | Status: SHIPPED | OUTPATIENT
Start: 2023-09-25

## 2023-11-14 ENCOUNTER — TELEPHONE (OUTPATIENT)
Age: 73
End: 2023-11-14

## 2023-11-14 NOTE — TELEPHONE ENCOUNTER
----- Message from [facility] sent at 11/14/2023 12:36 PM EST -----  Subject: Message to Provider    QUESTIONS  Information for Provider? PT called in to make sure of instructions for   labs on 11/16/23  ---------------------------------------------------------------------------  --------------  Otoniel NICHOLAS  [phone]; OK to leave message on voicemail  ---------------------------------------------------------------------------  --------------  SCRIPT ANSWERS  Relationship to Patient?  Self

## 2023-11-16 ENCOUNTER — HOSPITAL ENCOUNTER (OUTPATIENT)
Facility: HOSPITAL | Age: 73
Setting detail: SPECIMEN
Discharge: HOME | End: 2023-11-16
Payer: COMMERCIAL

## 2023-11-16 DIAGNOSIS — E11.9 TYPE 2 DIABETES MELLITUS WITHOUT COMPLICATION, WITHOUT LONG-TERM CURRENT USE OF INSULIN (HCC): ICD-10-CM

## 2023-11-16 LAB
BASOPHILS # BLD: 0 K/UL (ref 0–0.1)
BASOPHILS NFR BLD: 1 % (ref 0–2)
CREAT UR-MCNC: <13 MG/DL (ref 30–125)
DIFFERENTIAL METHOD BLD: ABNORMAL
EOSINOPHIL # BLD: 0.1 K/UL (ref 0–0.4)
EOSINOPHIL NFR BLD: 4 % (ref 0–5)
ERYTHROCYTE [DISTWIDTH] IN BLOOD BY AUTOMATED COUNT: 14.3 % (ref 11.6–14.5)
HBA1C MFR BLD: 6.1 % (ref 4.2–5.6)
HCT VFR BLD AUTO: 43.2 % (ref 36–48)
HGB BLD-MCNC: 13.7 G/DL (ref 13–16)
IMM GRANULOCYTES # BLD AUTO: 0 K/UL (ref 0–0.04)
IMM GRANULOCYTES NFR BLD AUTO: 0 % (ref 0–0.5)
LYMPHOCYTES # BLD: 1.4 K/UL (ref 0.9–3.6)
LYMPHOCYTES NFR BLD: 35 % (ref 21–52)
MCH RBC QN AUTO: 27.6 PG (ref 24–34)
MCHC RBC AUTO-ENTMCNC: 31.7 G/DL (ref 31–37)
MCV RBC AUTO: 87.1 FL (ref 78–100)
MICROALBUMIN UR-MCNC: <0.5 MG/DL (ref 0–3)
MICROALBUMIN/CREAT UR-RTO: ABNORMAL MG/G (ref 0–30)
MONOCYTES # BLD: 0.4 K/UL (ref 0.05–1.2)
MONOCYTES NFR BLD: 9 % (ref 3–10)
NEUTS SEG # BLD: 2.1 K/UL (ref 1.8–8)
NEUTS SEG NFR BLD: 52 % (ref 40–73)
NRBC # BLD: 0 K/UL (ref 0–0.01)
NRBC BLD-RTO: 0 PER 100 WBC
PLATELET # BLD AUTO: 209 K/UL (ref 135–420)
PMV BLD AUTO: 10.3 FL (ref 9.2–11.8)
RBC # BLD AUTO: 4.96 M/UL (ref 4.35–5.65)
WBC # BLD AUTO: 4 K/UL (ref 4.6–13.2)

## 2023-11-16 PROCEDURE — 36415 COLL VENOUS BLD VENIPUNCTURE: CPT

## 2023-11-16 PROCEDURE — 83036 HEMOGLOBIN GLYCOSYLATED A1C: CPT

## 2023-11-16 PROCEDURE — 82570 ASSAY OF URINE CREATININE: CPT

## 2023-11-16 PROCEDURE — 85025 COMPLETE CBC W/AUTO DIFF WBC: CPT

## 2023-11-16 PROCEDURE — 82043 UR ALBUMIN QUANTITATIVE: CPT

## 2023-11-24 ENCOUNTER — OFFICE VISIT (OUTPATIENT)
Age: 73
End: 2023-11-24
Payer: COMMERCIAL

## 2023-11-24 ENCOUNTER — TELEPHONE (OUTPATIENT)
Age: 73
End: 2023-11-24

## 2023-11-24 VITALS
DIASTOLIC BLOOD PRESSURE: 77 MMHG | WEIGHT: 176 LBS | TEMPERATURE: 97.9 F | SYSTOLIC BLOOD PRESSURE: 139 MMHG | HEART RATE: 79 BPM | HEIGHT: 71 IN | OXYGEN SATURATION: 98 % | BODY MASS INDEX: 24.64 KG/M2

## 2023-11-24 DIAGNOSIS — R91.8 PULMONARY NODULES: Primary | ICD-10-CM

## 2023-11-24 DIAGNOSIS — E11.9 TYPE 2 DIABETES MELLITUS WITHOUT COMPLICATION, WITHOUT LONG-TERM CURRENT USE OF INSULIN (HCC): ICD-10-CM

## 2023-11-24 DIAGNOSIS — K21.9 GASTROESOPHAGEAL REFLUX DISEASE WITHOUT ESOPHAGITIS: ICD-10-CM

## 2023-11-24 DIAGNOSIS — M25.511 CHRONIC RIGHT SHOULDER PAIN: ICD-10-CM

## 2023-11-24 DIAGNOSIS — E78.5 DYSLIPIDEMIA: ICD-10-CM

## 2023-11-24 DIAGNOSIS — G89.29 CHRONIC RIGHT SHOULDER PAIN: ICD-10-CM

## 2023-11-24 PROBLEM — E66.3 OVERWEIGHT WITH BODY MASS INDEX (BMI) OF 25 TO 25.9 IN ADULT: Status: RESOLVED | Noted: 2020-10-12 | Resolved: 2023-11-24

## 2023-11-24 PROCEDURE — 3044F HG A1C LEVEL LT 7.0%: CPT | Performed by: INTERNAL MEDICINE

## 2023-11-24 PROCEDURE — 99214 OFFICE O/P EST MOD 30 MIN: CPT | Performed by: INTERNAL MEDICINE

## 2023-11-24 PROCEDURE — 1123F ACP DISCUSS/DSCN MKR DOCD: CPT | Performed by: INTERNAL MEDICINE

## 2023-11-24 RX ORDER — HYDROCODONE BITARTRATE AND ACETAMINOPHEN 5; 325 MG/1; MG/1
1 TABLET ORAL EVERY 8 HOURS PRN
Qty: 30 TABLET | Refills: 0 | Status: SHIPPED | OUTPATIENT
Start: 2023-11-24 | End: 2023-12-24

## 2023-11-24 SDOH — ECONOMIC STABILITY: FOOD INSECURITY: WITHIN THE PAST 12 MONTHS, YOU WORRIED THAT YOUR FOOD WOULD RUN OUT BEFORE YOU GOT MONEY TO BUY MORE.: NEVER TRUE

## 2023-11-24 SDOH — ECONOMIC STABILITY: FOOD INSECURITY: WITHIN THE PAST 12 MONTHS, THE FOOD YOU BOUGHT JUST DIDN'T LAST AND YOU DIDN'T HAVE MONEY TO GET MORE.: NEVER TRUE

## 2023-11-24 SDOH — ECONOMIC STABILITY: INCOME INSECURITY: HOW HARD IS IT FOR YOU TO PAY FOR THE VERY BASICS LIKE FOOD, HOUSING, MEDICAL CARE, AND HEATING?: NOT HARD AT ALL

## 2023-11-24 ASSESSMENT — PATIENT HEALTH QUESTIONNAIRE - PHQ9
SUM OF ALL RESPONSES TO PHQ9 QUESTIONS 1 & 2: 0
2. FEELING DOWN, DEPRESSED OR HOPELESS: 0
SUM OF ALL RESPONSES TO PHQ QUESTIONS 1-9: 0
1. LITTLE INTEREST OR PLEASURE IN DOING THINGS: 0
SUM OF ALL RESPONSES TO PHQ QUESTIONS 1-9: 0

## 2023-11-24 NOTE — TELEPHONE ENCOUNTER
Pls call pt  He was supposed to get CT chest to follow up on pulm nodules - ordered 5/23 last visit  Give number to radiology to he can schedule

## 2024-01-16 ENCOUNTER — TELEPHONE (OUTPATIENT)
Age: 74
End: 2024-01-16

## 2024-01-16 DIAGNOSIS — E78.5 DYSLIPIDEMIA: ICD-10-CM

## 2024-01-16 DIAGNOSIS — J30.89 NON-SEASONAL ALLERGIC RHINITIS, UNSPECIFIED TRIGGER: ICD-10-CM

## 2024-01-16 DIAGNOSIS — K21.9 GASTROESOPHAGEAL REFLUX DISEASE WITHOUT ESOPHAGITIS: Primary | ICD-10-CM

## 2024-01-16 RX ORDER — PANTOPRAZOLE SODIUM 40 MG/1
40 TABLET, DELAYED RELEASE ORAL DAILY
Qty: 90 TABLET | Refills: 3 | Status: SHIPPED | OUTPATIENT
Start: 2024-01-16

## 2024-01-16 RX ORDER — EZETIMIBE 10 MG/1
10 TABLET ORAL DAILY
Qty: 90 TABLET | Refills: 3 | Status: SHIPPED | OUTPATIENT
Start: 2024-01-16

## 2024-01-16 RX ORDER — FLUTICASONE PROPIONATE 50 MCG
2 SPRAY, SUSPENSION (ML) NASAL DAILY
Qty: 48 G | Refills: 3 | Status: SHIPPED | OUTPATIENT
Start: 2024-01-16

## 2024-01-16 RX ORDER — ATORVASTATIN CALCIUM 80 MG/1
80 TABLET, FILM COATED ORAL DAILY
Qty: 90 TABLET | Refills: 3 | Status: SHIPPED | OUTPATIENT
Start: 2024-01-16

## 2024-01-16 NOTE — TELEPHONE ENCOUNTER
Please refill and send to [facility] [address] - P [phone] - F [phone] [23]     atorvastatin (LIPITOR) 80 MG tablet     pantoprazole (PROTONIX) 40 MG tablet       ezetimibe (ZETIA) 10 MG tablet     fluticasone (FLONASE) 50 MCG/ACT nasal spray

## 2024-03-08 DIAGNOSIS — F11.99 OPIOID USE, UNSPECIFIED WITH UNSPECIFIED OPIOID-INDUCED DISORDER (HCC): Primary | ICD-10-CM

## 2024-03-08 NOTE — TELEPHONE ENCOUNTER
Refill request via phone     HYDROcodone-acetaminophen (NORCO) 5-325 MG per tablet     [facility] in [place]

## 2024-03-11 RX ORDER — HYDROCODONE BITARTRATE AND ACETAMINOPHEN 5; 325 MG/1; MG/1
1 TABLET ORAL EVERY 8 HOURS PRN
Qty: 30 TABLET | Refills: 0 | Status: SHIPPED | OUTPATIENT
Start: 2024-03-11 | End: 2024-04-10

## 2024-05-23 NOTE — PROGRESS NOTES
74 y.o. male who presents for evaluation    No cardiovascular complaints.  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 dR Mcqueen told him he would have to live with it going forward, declined another ortho opinion    No gi or gu issues.      Denies polyuria, polydipsia, nocturia, vision change. Kartik is following him, getting fbs in the 100-130 range mostly    Again, he did not get the CT chest done after the last visit but wants to reorder    LAST MEDICARE WELLNESS EXAM: never as not medicare b    Past Medical History:   Diagnosis Date    Allergic rhinitis     Carotid disease, bilateral (HCC)     comm screen mild BICA, AAA neg, RABI 1.00/LABI 1.06 (11/21); mild BICA, AAA neg, 1.05/1.19 (2/23)    Colon adenoma 04/10/2023    Dr Alfonso and divertics    COVID-19 vaccine dose declined 11/2022    boosters    DM (diabetes mellitus) ([facility]) 2014    Controlled on dietary mgmt    Dyslipidemia     GERD (gastroesophageal reflux disease)     H/O cardiac catheterization 1998    negative Dr Khan for false positive stress test    Hearing loss 1996    LEFT; Dr Carcamo s/p tympanoplasty, atticotomy, temporofascial graft for cholesteotoma    Opioid use, unspecified with unspecified opioid-induced disorder ([facility])     Overweight     Pulmonary nodules 11/2021    LDCT showed mult <6mm bilat nodules, mild bronchiectatic changes, gallstones, coronary calcifications (11/21); no change (5/22)    Sarcoidosis 1991    Dr Lee on bx    SCC (squamous cell carcinoma)     Dr Dexter; face    Venous stasis     Vocal cord paralysis 12/2004    RIGHT     Past Surgical History:   Procedure Laterality Date    CARPAL TUNNEL RELEASE Bilateral 1993    Dr Vera     CATARACT REMOVAL Bilateral 2009    Dr Lee     COLONOSCOPY N/A 04/10/2023    Dr Alfonso (6/11/12) neg; (4/10/23) divertics and polyp;

## 2024-05-24 ENCOUNTER — HOSPITAL ENCOUNTER (OUTPATIENT)
Facility: HOSPITAL | Age: 74
Setting detail: SPECIMEN
End: 2024-05-24
Payer: COMMERCIAL

## 2024-05-24 DIAGNOSIS — E11.9 TYPE 2 DIABETES MELLITUS WITHOUT COMPLICATION, WITHOUT LONG-TERM CURRENT USE OF INSULIN (HCC): ICD-10-CM

## 2024-05-24 DIAGNOSIS — F11.99 OPIOID USE, UNSPECIFIED WITH UNSPECIFIED OPIOID-INDUCED DISORDER (HCC): ICD-10-CM

## 2024-05-24 LAB
ALBUMIN SERPL-MCNC: 3.8 G/DL (ref 3.4–5)
ALBUMIN/GLOB SERPL: 1.1 (ref 0.8–1.7)
ALP SERPL-CCNC: 97 U/L (ref 45–117)
ALT SERPL-CCNC: 38 U/L (ref 16–61)
AMPHET UR QL SCN: NEGATIVE
ANION GAP SERPL CALC-SCNC: 7 MMOL/L (ref 3–18)
AST SERPL-CCNC: 27 U/L (ref 10–38)
BARBITURATES UR QL SCN: NEGATIVE
BENZODIAZ UR QL: NEGATIVE
BILIRUB SERPL-MCNC: 0.7 MG/DL (ref 0.2–1)
BUN SERPL-MCNC: 16 MG/DL (ref 7–18)
BUN/CREAT SERPL: 20 (ref 12–20)
CALCIUM SERPL-MCNC: 10.1 MG/DL (ref 8.5–10.1)
CANNABINOIDS UR QL SCN: NEGATIVE
CHLORIDE SERPL-SCNC: 105 MMOL/L (ref 100–111)
CHOLEST SERPL-MCNC: 126 MG/DL
CO2 SERPL-SCNC: 26 MMOL/L (ref 21–32)
COCAINE UR QL SCN: NEGATIVE
CREAT SERPL-MCNC: 0.81 MG/DL (ref 0.6–1.3)
GLOBULIN SER CALC-MCNC: 3.4 G/DL (ref 2–4)
GLUCOSE SERPL-MCNC: 125 MG/DL (ref 74–99)
HBA1C MFR BLD: 6.2 % (ref 4.2–5.6)
HDLC SERPL-MCNC: 43 MG/DL (ref 40–60)
HDLC SERPL: 2.9 (ref 0–5)
LDLC SERPL CALC-MCNC: 66.2 MG/DL (ref 0–100)
LIPID PANEL: NORMAL
Lab: NORMAL
METHADONE UR QL: NEGATIVE
OPIATES UR QL: NEGATIVE
PCP UR QL: NEGATIVE
POTASSIUM SERPL-SCNC: 5 MMOL/L (ref 3.5–5.5)
PROT SERPL-MCNC: 7.2 G/DL (ref 6.4–8.2)
PSA SERPL-MCNC: 0.3 NG/ML (ref 0–4)
SODIUM SERPL-SCNC: 138 MMOL/L (ref 136–145)
TRIGL SERPL-MCNC: 84 MG/DL
VLDLC SERPL CALC-MCNC: 16.8 MG/DL

## 2024-05-24 PROCEDURE — 36415 COLL VENOUS BLD VENIPUNCTURE: CPT

## 2024-05-24 PROCEDURE — G0103 PSA SCREENING: HCPCS

## 2024-05-24 PROCEDURE — 80061 LIPID PANEL: CPT

## 2024-05-24 PROCEDURE — 80307 DRUG TEST PRSMV CHEM ANLYZR: CPT

## 2024-05-24 PROCEDURE — 80053 COMPREHEN METABOLIC PANEL: CPT

## 2024-05-24 PROCEDURE — 83036 HEMOGLOBIN GLYCOSYLATED A1C: CPT

## 2024-05-29 ENCOUNTER — OFFICE VISIT (OUTPATIENT)
Age: 74
End: 2024-05-29
Payer: COMMERCIAL

## 2024-05-29 VITALS
RESPIRATION RATE: 18 BRPM | DIASTOLIC BLOOD PRESSURE: 76 MMHG | OXYGEN SATURATION: 97 % | HEIGHT: 71 IN | TEMPERATURE: 98.4 F | WEIGHT: 182 LBS | SYSTOLIC BLOOD PRESSURE: 134 MMHG | HEART RATE: 86 BPM | BODY MASS INDEX: 25.48 KG/M2

## 2024-05-29 DIAGNOSIS — E11.9 TYPE 2 DIABETES MELLITUS WITHOUT COMPLICATION, WITHOUT LONG-TERM CURRENT USE OF INSULIN (HCC): ICD-10-CM

## 2024-05-29 DIAGNOSIS — J30.89 NON-SEASONAL ALLERGIC RHINITIS, UNSPECIFIED TRIGGER: ICD-10-CM

## 2024-05-29 DIAGNOSIS — Z00.00 PHYSICAL EXAM: Primary | ICD-10-CM

## 2024-05-29 DIAGNOSIS — M25.519 ACUTE SHOULDER PAIN, UNSPECIFIED LATERALITY: ICD-10-CM

## 2024-05-29 DIAGNOSIS — E78.5 DYSLIPIDEMIA: ICD-10-CM

## 2024-05-29 DIAGNOSIS — K21.9 GASTROESOPHAGEAL REFLUX DISEASE WITHOUT ESOPHAGITIS: ICD-10-CM

## 2024-05-29 DIAGNOSIS — R91.8 PULMONARY NODULES: ICD-10-CM

## 2024-05-29 PROBLEM — F11.99 OPIOID USE, UNSPECIFIED WITH UNSPECIFIED OPIOID-INDUCED DISORDER (HCC): Status: RESOLVED | Noted: 2022-11-09 | Resolved: 2024-05-29

## 2024-05-29 PROCEDURE — 99397 PER PM REEVAL EST PAT 65+ YR: CPT | Performed by: INTERNAL MEDICINE

## 2024-05-29 RX ORDER — PANTOPRAZOLE SODIUM 40 MG/1
40 TABLET, DELAYED RELEASE ORAL DAILY
Qty: 90 TABLET | Refills: 3 | Status: SHIPPED | OUTPATIENT
Start: 2024-05-29

## 2024-05-29 RX ORDER — ATORVASTATIN CALCIUM 80 MG/1
80 TABLET, FILM COATED ORAL DAILY
Qty: 90 TABLET | Refills: 3 | Status: SHIPPED | OUTPATIENT
Start: 2024-05-29

## 2024-05-29 RX ORDER — HYDROCODONE BITARTRATE AND ACETAMINOPHEN 5; 325 MG/1; MG/1
1 TABLET ORAL EVERY 8 HOURS PRN
Qty: 30 TABLET | Refills: 0 | Status: SHIPPED | OUTPATIENT
Start: 2024-05-29 | End: 2024-06-28

## 2024-05-29 RX ORDER — FLUTICASONE PROPIONATE 50 MCG
2 SPRAY, SUSPENSION (ML) NASAL DAILY
Qty: 48 G | Refills: 3 | Status: SHIPPED | OUTPATIENT
Start: 2024-05-29

## 2024-05-29 RX ORDER — EZETIMIBE 10 MG/1
10 TABLET ORAL DAILY
Qty: 90 TABLET | Refills: 3 | Status: SHIPPED | OUTPATIENT
Start: 2024-05-29

## 2024-05-29 ASSESSMENT — PATIENT HEALTH QUESTIONNAIRE - PHQ9
SUM OF ALL RESPONSES TO PHQ QUESTIONS 1-9: 0
SUM OF ALL RESPONSES TO PHQ QUESTIONS 1-9: 0
SUM OF ALL RESPONSES TO PHQ9 QUESTIONS 1 & 2: 0
2. FEELING DOWN, DEPRESSED OR HOPELESS: NOT AT ALL
1. LITTLE INTEREST OR PLEASURE IN DOING THINGS: NOT AT ALL
SUM OF ALL RESPONSES TO PHQ QUESTIONS 1-9: 0
SUM OF ALL RESPONSES TO PHQ QUESTIONS 1-9: 0

## 2024-05-29 NOTE — PROGRESS NOTES
Reilly Esqueda presents today for Chronic condition.              1. \"Have you been to the ER, urgent care clinic since your last visit?  Hospitalized since your last visit?\" no    2. \"Have you seen or consulted any other health care providers outside of the [facility] since your last visit?\" no     3. For patients aged 45-75: Has the patient had a colonoscopy / FIT/ Cologuard? Yes - no Care Gap present      If the patient is female:    4. For patients aged 40-74: Has the patient had a mammogram within the past 2 years? NA - based on age or sex      5. For patients aged 21-65: Has the patient had a pap smear? NA - based on age or sex

## 2024-08-18 ENCOUNTER — TELEPHONE (OUTPATIENT)
Facility: CLINIC | Age: 74
End: 2024-08-18

## 2024-08-18 DIAGNOSIS — I10 PRIMARY HYPERTENSION: Primary | ICD-10-CM

## 2024-08-18 RX ORDER — LOSARTAN POTASSIUM 50 MG/1
50 TABLET ORAL DAILY
Qty: 90 TABLET | Refills: 1 | Status: SHIPPED | OUTPATIENT
Start: 2024-08-18

## 2024-09-20 ENCOUNTER — TELEPHONE (OUTPATIENT)
Facility: CLINIC | Age: 74
End: 2024-09-20

## 2024-10-15 ENCOUNTER — PATIENT MESSAGE (OUTPATIENT)
Facility: CLINIC | Age: 74
End: 2024-10-15

## 2024-10-21 DIAGNOSIS — I10 PRIMARY HYPERTENSION: ICD-10-CM

## 2024-10-24 RX ORDER — LOSARTAN POTASSIUM 50 MG/1
50 TABLET ORAL DAILY
Qty: 90 TABLET | Refills: 3 | Status: SHIPPED | OUTPATIENT
Start: 2024-10-24

## 2024-11-11 DIAGNOSIS — I10 PRIMARY HYPERTENSION: ICD-10-CM

## 2024-11-11 RX ORDER — HYDROCHLOROTHIAZIDE 25 MG/1
25 TABLET ORAL EVERY MORNING
Qty: 90 TABLET | Refills: 3 | Status: SHIPPED | OUTPATIENT
Start: 2024-11-11

## 2024-11-29 ENCOUNTER — HOSPITAL ENCOUNTER (OUTPATIENT)
Facility: HOSPITAL | Age: 74
Setting detail: SPECIMEN
Discharge: HOME | End: 2024-12-02
Payer: COMMERCIAL

## 2024-11-29 DIAGNOSIS — E11.9 TYPE 2 DIABETES MELLITUS WITHOUT COMPLICATION, WITHOUT LONG-TERM CURRENT USE OF INSULIN (HCC): ICD-10-CM

## 2024-11-29 LAB
ANION GAP SERPL CALC-SCNC: 4 MMOL/L (ref 3–18)
BASOPHILS # BLD: 0 K/UL (ref 0–0.1)
BASOPHILS NFR BLD: 0 % (ref 0–2)
BUN SERPL-MCNC: 17 MG/DL (ref 7–18)
BUN/CREAT SERPL: 20 (ref 12–20)
CALCIUM SERPL-MCNC: 10.5 MG/DL (ref 8.5–10.1)
CHLORIDE SERPL-SCNC: 104 MMOL/L (ref 100–111)
CO2 SERPL-SCNC: 28 MMOL/L (ref 21–32)
CREAT SERPL-MCNC: 0.84 MG/DL (ref 0.6–1.3)
CREAT UR-MCNC: <13 MG/DL (ref 30–125)
DIFFERENTIAL METHOD BLD: NORMAL
EOSINOPHIL # BLD: 0.2 K/UL (ref 0–0.4)
EOSINOPHIL NFR BLD: 4 % (ref 0–5)
ERYTHROCYTE [DISTWIDTH] IN BLOOD BY AUTOMATED COUNT: 14.3 % (ref 11.6–14.5)
GLUCOSE SERPL-MCNC: 111 MG/DL (ref 74–99)
HBA1C MFR BLD: 6.3 % (ref 4.2–5.6)
HCT VFR BLD AUTO: 43 % (ref 36–48)
HGB BLD-MCNC: 14.1 G/DL (ref 13–16)
IMM GRANULOCYTES # BLD AUTO: 0 K/UL (ref 0–0.04)
IMM GRANULOCYTES NFR BLD AUTO: 0 % (ref 0–0.5)
LYMPHOCYTES # BLD: 1.5 K/UL (ref 0.9–3.6)
LYMPHOCYTES NFR BLD: 27 % (ref 21–52)
MCH RBC QN AUTO: 28 PG (ref 24–34)
MCHC RBC AUTO-ENTMCNC: 32.8 G/DL (ref 31–37)
MCV RBC AUTO: 85.3 FL (ref 78–100)
MICROALBUMIN UR-MCNC: <0.5 MG/DL (ref 0–3)
MICROALBUMIN/CREAT UR-RTO: ABNORMAL MG/G (ref 0–30)
MONOCYTES # BLD: 0.4 K/UL (ref 0.05–1.2)
MONOCYTES NFR BLD: 8 % (ref 3–10)
NEUTS SEG # BLD: 3.3 K/UL (ref 1.8–8)
NEUTS SEG NFR BLD: 61 % (ref 40–73)
NRBC # BLD: 0 K/UL (ref 0–0.01)
NRBC BLD-RTO: 0 PER 100 WBC
PLATELET # BLD AUTO: 255 K/UL (ref 135–420)
PMV BLD AUTO: 10.2 FL (ref 9.2–11.8)
POTASSIUM SERPL-SCNC: 4.3 MMOL/L (ref 3.5–5.5)
RBC # BLD AUTO: 5.04 M/UL (ref 4.35–5.65)
SODIUM SERPL-SCNC: 136 MMOL/L (ref 136–145)
WBC # BLD AUTO: 5.4 K/UL (ref 4.6–13.2)

## 2024-11-29 PROCEDURE — 36415 COLL VENOUS BLD VENIPUNCTURE: CPT

## 2024-11-29 PROCEDURE — 80048 BASIC METABOLIC PNL TOTAL CA: CPT

## 2024-11-29 PROCEDURE — 82043 UR ALBUMIN QUANTITATIVE: CPT

## 2024-11-29 PROCEDURE — 82570 ASSAY OF URINE CREATININE: CPT

## 2024-11-29 PROCEDURE — 85025 COMPLETE CBC W/AUTO DIFF WBC: CPT

## 2024-11-29 PROCEDURE — 83036 HEMOGLOBIN GLYCOSYLATED A1C: CPT

## 2024-12-02 DIAGNOSIS — I10 PRIMARY HYPERTENSION: ICD-10-CM

## 2024-12-02 RX ORDER — LOSARTAN POTASSIUM 100 MG/1
100 TABLET ORAL DAILY
Qty: 90 TABLET | Refills: 3 | Status: SHIPPED | OUTPATIENT
Start: 2024-12-02

## 2024-12-02 RX ORDER — LOSARTAN POTASSIUM 50 MG/1
50 TABLET ORAL 2 TIMES DAILY
Qty: 180 TABLET | Refills: 1 | Status: CANCELLED | OUTPATIENT
Start: 2024-12-02

## 2024-12-02 NOTE — TELEPHONE ENCOUNTER
Patient contacted the office asking if we can fill his losartan as he has been taking 2 a day instead of 1 and he will be running on on Sunday    [facility]

## 2024-12-06 ENCOUNTER — OFFICE VISIT (OUTPATIENT)
Facility: CLINIC | Age: 74
End: 2024-12-06
Payer: COMMERCIAL

## 2024-12-06 VITALS
TEMPERATURE: 98.3 F | OXYGEN SATURATION: 99 % | BODY MASS INDEX: 24.78 KG/M2 | RESPIRATION RATE: 16 BRPM | DIASTOLIC BLOOD PRESSURE: 72 MMHG | HEART RATE: 76 BPM | SYSTOLIC BLOOD PRESSURE: 121 MMHG | WEIGHT: 177 LBS | HEIGHT: 71 IN

## 2024-12-06 DIAGNOSIS — R91.8 PULMONARY NODULES: ICD-10-CM

## 2024-12-06 DIAGNOSIS — E83.52 HYPERCALCEMIA: ICD-10-CM

## 2024-12-06 DIAGNOSIS — I10 PRIMARY HYPERTENSION: ICD-10-CM

## 2024-12-06 DIAGNOSIS — E78.5 DYSLIPIDEMIA: ICD-10-CM

## 2024-12-06 DIAGNOSIS — E11.9 TYPE 2 DIABETES MELLITUS WITHOUT COMPLICATION, WITHOUT LONG-TERM CURRENT USE OF INSULIN (HCC): ICD-10-CM

## 2024-12-06 DIAGNOSIS — Z12.5 SCREENING FOR MALIGNANT NEOPLASM OF PROSTATE: ICD-10-CM

## 2024-12-06 DIAGNOSIS — M19.019 PRIMARY OSTEOARTHRITIS OF SHOULDER, UNSPECIFIED LATERALITY: Primary | ICD-10-CM

## 2024-12-06 PROCEDURE — 3044F HG A1C LEVEL LT 7.0%: CPT | Performed by: INTERNAL MEDICINE

## 2024-12-06 PROCEDURE — 99214 OFFICE O/P EST MOD 30 MIN: CPT | Performed by: INTERNAL MEDICINE

## 2024-12-06 PROCEDURE — 3074F SYST BP LT 130 MM HG: CPT | Performed by: INTERNAL MEDICINE

## 2024-12-06 PROCEDURE — 1123F ACP DISCUSS/DSCN MKR DOCD: CPT | Performed by: INTERNAL MEDICINE

## 2024-12-06 PROCEDURE — 3078F DIAST BP <80 MM HG: CPT | Performed by: INTERNAL MEDICINE

## 2024-12-06 RX ORDER — HYDROCODONE BITARTRATE AND ACETAMINOPHEN 5; 325 MG/1; MG/1
1 TABLET ORAL EVERY 8 HOURS PRN
Qty: 30 TABLET | Refills: 0 | Status: SHIPPED | OUTPATIENT
Start: 2024-12-06 | End: 2025-01-05

## 2024-12-06 NOTE — PROGRESS NOTES
Reilly Esqueda presents today for   Chief Complaint   Patient presents with    6 Month Follow-Up    Hypertension    Diabetes       \"Have you been to the ER, urgent care clinic since your last visit?  Hospitalized since your last visit?\"    NO    “Have you seen or consulted any other health care providers outside of [facility] since your last visit?”    NO           
W/O EAG   Result Value Ref Range    Hemoglobin A1C 6.3 (H) 4.2 - 5.6 %   Microalbumin / Creatinine Urine Ratio   Result Value Ref Range    Microalb, Ur <0.50 0 - 3.0 MG/DL    Creatinine, Ur <13.00 (L) 30 - 125 mg/dL    Microalb/Creat Ratio Cannot be calculated 0 - 30 mg/g     We reviewed the patient's labs from the last several visits to point out trends in the numbers          Patient Active Problem List   Diagnosis    Allergic rhinitis    Type 2 diabetes mellitus (HCC)    Dyslipidemia    Sarcoidosis    GERD (gastroesophageal reflux disease)    Pulmonary nodules    Primary hypertension     Assessment and plan:  1. Sarcoidosis.  Inactive  2. DM. Controlled on dietary mgmt. F/u ophth  3. Allergies.  Continue current regimen.  4. GERD.  PPI and avoidance measures  5. Dyslipidemia. Continue lipitor and zetia  6. Lung nodules.  CT to be rescheduled by pt  7. Colon adenomas.  Fiber, colo 2028  8. Shoulder pain.  Refilled norco  9.  HTN.  His machine is reading 10pts higher.  Continue current but recheck bmp below  10.  Hypercalcemia.  He's on thiazide; will recheck 4 weeks and change to amlo if persistent      RTC 6/25      Above conditions discussed at length and patient vocalized understanding.  All questions answered to patient satisfaction     Diagnosis Orders   1. Primary osteoarthritis of shoulder, unspecified laterality  HYDROcodone-acetaminophen (NORCO) 5-325 MG per tablet      2. Hypercalcemia  Basic Metabolic Panel      3. Primary hypertension        4. Pulmonary nodules        5. Type 2 diabetes mellitus without complication, without long-term current use of insulin (HCC)  Comprehensive Metabolic Panel    HEMOGLOBIN A1C W/O EAG      6. Dyslipidemia  Lipid Panel      7. Screening for malignant neoplasm of prostate  PSA Screening

## 2025-01-27 ENCOUNTER — HOSPITAL ENCOUNTER (OUTPATIENT)
Facility: HOSPITAL | Age: 75
Setting detail: SPECIMEN
Discharge: HOME | End: 2025-01-30
Payer: COMMERCIAL

## 2025-01-27 DIAGNOSIS — E83.52 HYPERCALCEMIA: ICD-10-CM

## 2025-01-27 LAB
ANION GAP SERPL CALC-SCNC: 3 MMOL/L (ref 3–18)
BUN SERPL-MCNC: 18 MG/DL (ref 7–18)
BUN/CREAT SERPL: 23 (ref 12–20)
CALCIUM SERPL-MCNC: 9.9 MG/DL (ref 8.5–10.1)
CHLORIDE SERPL-SCNC: 104 MMOL/L (ref 100–111)
CO2 SERPL-SCNC: 31 MMOL/L (ref 21–32)
CREAT SERPL-MCNC: 0.79 MG/DL (ref 0.6–1.3)
GLUCOSE SERPL-MCNC: 127 MG/DL (ref 74–99)
POTASSIUM SERPL-SCNC: 4.9 MMOL/L (ref 3.5–5.5)
SODIUM SERPL-SCNC: 138 MMOL/L (ref 136–145)

## 2025-01-27 PROCEDURE — 80048 BASIC METABOLIC PNL TOTAL CA: CPT

## 2025-01-27 PROCEDURE — 36415 COLL VENOUS BLD VENIPUNCTURE: CPT

## 2025-02-05 ENCOUNTER — TELEPHONE (OUTPATIENT)
Facility: CLINIC | Age: 75
End: 2025-02-05

## 2025-02-05 DIAGNOSIS — U07.1 COVID-19 VIRUS INFECTION: Primary | ICD-10-CM

## 2025-02-05 NOTE — TELEPHONE ENCOUNTER
The patient is requesting a call back, he says his grandson came home with covid last week, So he took a home test for himself and now he has covid. He has no symptoms, he says he had a little sinus congestion a few days ago. But no new or current symptoms.  Please advise.

## 2025-02-06 NOTE — TELEPHONE ENCOUNTER
Called and spoke to patient, relayed message from Dr. Hoff; patient verbalized understanding.    No further action required.

## 2025-02-06 NOTE — TELEPHONE ENCOUNTER
Paxlovid sent  Hold lipitor while taking       Diagnosis Orders   1. COVID-19 virus infection  nirmatrelvir/ritonavir 300/100 (PAXLOVID, 300/100,) 20 x 150 MG & 10 x 100MG TBPK

## 2025-03-15 DIAGNOSIS — K21.9 GASTROESOPHAGEAL REFLUX DISEASE WITHOUT ESOPHAGITIS: ICD-10-CM

## 2025-03-15 DIAGNOSIS — E78.5 DYSLIPIDEMIA: ICD-10-CM

## 2025-03-17 RX ORDER — EZETIMIBE 10 MG/1
10 TABLET ORAL DAILY
Qty: 90 TABLET | Refills: 3 | Status: SHIPPED | OUTPATIENT
Start: 2025-03-17

## 2025-03-17 RX ORDER — PANTOPRAZOLE SODIUM 40 MG/1
40 TABLET, DELAYED RELEASE ORAL DAILY
Qty: 90 TABLET | Refills: 3 | Status: SHIPPED | OUTPATIENT
Start: 2025-03-17

## 2025-03-17 RX ORDER — ATORVASTATIN CALCIUM 80 MG/1
80 TABLET, FILM COATED ORAL DAILY
Qty: 90 TABLET | Refills: 3 | Status: SHIPPED | OUTPATIENT
Start: 2025-03-17

## 2025-05-21 ENCOUNTER — OFFICE VISIT (OUTPATIENT)
Age: 75
End: 2025-05-21
Payer: COMMERCIAL

## 2025-05-21 VITALS
WEIGHT: 173 LBS | TEMPERATURE: 96.9 F | RESPIRATION RATE: 20 BRPM | HEIGHT: 71 IN | BODY MASS INDEX: 24.22 KG/M2 | HEART RATE: 81 BPM | DIASTOLIC BLOOD PRESSURE: 60 MMHG | OXYGEN SATURATION: 96 % | SYSTOLIC BLOOD PRESSURE: 128 MMHG

## 2025-05-21 DIAGNOSIS — S81.801A NON-HEALING WOUND OF LOWER EXTREMITY, RIGHT, INITIAL ENCOUNTER: Primary | ICD-10-CM

## 2025-05-21 PROCEDURE — 3074F SYST BP LT 130 MM HG: CPT | Performed by: SURGERY

## 2025-05-21 PROCEDURE — 1123F ACP DISCUSS/DSCN MKR DOCD: CPT | Performed by: SURGERY

## 2025-05-21 PROCEDURE — 3078F DIAST BP <80 MM HG: CPT | Performed by: SURGERY

## 2025-05-21 PROCEDURE — 99203 OFFICE O/P NEW LOW 30 MIN: CPT | Performed by: SURGERY

## 2025-05-21 NOTE — PROGRESS NOTES
Reilly Zayaspatrick is a 75 y.o. male (: 1950)     Chief Complaint   Patient presents with    New Patient     Right leg wound        Medication list and allergies have been reviewed with Reilly Esqueda and updated as of today's date.     I have gone over all Medical, Surgical and Social History with Reilly Esqueda and updated/added the information accordingly.

## 2025-05-22 NOTE — PROGRESS NOTES
CC:   Chief Complaint   Patient presents with    New Patient     Right leg wound         Assessment:    ICD-10-CM    1. Non-healing wound of lower extremity, right, initial encounter  S81.801A           Plan: The wound appears to be healing with no signs of infection at this time. I encouraged him to continue to keep the area clean and dry as he does a lot of physical labor for work and he would be at risk for dehiscences if the area does not remain protected for the next several weeks. The sutures were removed in the clinic without complications and steri strip dressing was applied. He should leave this on until they fall off on their own. He agrees with this plan and will call in the future if he has any questions or concerns.         HPI:  Reilly Esqueda is a 75 y.o. male who is referred by Rush Hoff MD for right lower extremity wound. He states he cut his leg at work on a  and went to the ER where they stitched his wound. He returned to them on the 15 th for suture removal and felt the area was not healed. He denies any pain. No fevers, chills or drainage from this wound. He finished antibiotics on 5/6/2025. He is keeping the wound clean and covered while at work.     Allergies:  Allergies   Allergen Reactions    Other/Food Shortness Of Breath     Electronic Cigarettes (Vape)    Methylprednisolone      nervous    Sulfa Antibiotics      Other reaction(s): Unknown (comments)    Amoxicillin-Pot Clavulanate Rash       Medication Review:  Current Outpatient Medications on File Prior to Visit   Medication Sig Dispense Refill    ezetimibe (ZETIA) 10 MG tablet TAKE 1 TABLET DAILY 90 tablet 3    atorvastatin (LIPITOR) 80 MG tablet TAKE 1 TABLET DAILY 90 tablet 3    pantoprazole (PROTONIX) 40 MG tablet TAKE 1 TABLET DAILY 90 tablet 3    losartan (COZAAR) 100 MG tablet Take 1 tablet by mouth daily 90 tablet 3    hydroCHLOROthiazide (HYDRODIURIL) 25 MG tablet Take 1 tablet by mouth every morning 90

## 2025-05-23 ENCOUNTER — HOSPITAL ENCOUNTER (OUTPATIENT)
Age: 75
Discharge: HOME | End: 2025-05-26
Payer: COMMERCIAL

## 2025-05-23 DIAGNOSIS — R91.8 PULMONARY NODULES: ICD-10-CM

## 2025-05-23 PROCEDURE — 71250 CT THORAX DX C-: CPT

## 2025-05-23 ASSESSMENT — ENCOUNTER SYMPTOMS
CHEST TIGHTNESS: 0
SHORTNESS OF BREATH: 0

## 2025-06-06 ENCOUNTER — HOSPITAL ENCOUNTER (OUTPATIENT)
Facility: HOSPITAL | Age: 75
Setting detail: SPECIMEN
Discharge: HOME | End: 2025-06-09
Payer: COMMERCIAL

## 2025-06-06 DIAGNOSIS — Z12.5 SCREENING FOR MALIGNANT NEOPLASM OF PROSTATE: ICD-10-CM

## 2025-06-06 DIAGNOSIS — E78.5 DYSLIPIDEMIA: ICD-10-CM

## 2025-06-06 DIAGNOSIS — E11.9 TYPE 2 DIABETES MELLITUS WITHOUT COMPLICATION, WITHOUT LONG-TERM CURRENT USE OF INSULIN (HCC): ICD-10-CM

## 2025-06-06 LAB
ALBUMIN SERPL-MCNC: 3.6 G/DL (ref 3.4–5)
ALBUMIN/GLOB SERPL: 1.2 (ref 0.8–1.7)
ALP SERPL-CCNC: 62 U/L (ref 45–117)
ALT SERPL-CCNC: 33 U/L (ref 10–50)
ANION GAP SERPL CALC-SCNC: 14 MMOL/L (ref 3–18)
AST SERPL-CCNC: 31 U/L (ref 10–38)
BILIRUB SERPL-MCNC: 0.5 MG/DL (ref 0.2–1)
BUN SERPL-MCNC: 14 MG/DL (ref 6–23)
BUN/CREAT SERPL: 19 (ref 12–20)
CALCIUM SERPL-MCNC: 10.1 MG/DL (ref 8.5–10.1)
CHLORIDE SERPL-SCNC: 103 MMOL/L (ref 98–107)
CHOLEST SERPL-MCNC: 142 MG/DL
CO2 SERPL-SCNC: 25 MMOL/L (ref 21–32)
CREAT SERPL-MCNC: 0.75 MG/DL (ref 0.6–1.3)
GLOBULIN SER CALC-MCNC: 3 G/DL (ref 2–4)
GLUCOSE SERPL-MCNC: 119 MG/DL (ref 74–108)
HBA1C MFR BLD: 6.3 % (ref 4.2–5.6)
HDLC SERPL-MCNC: 46 MG/DL (ref 40–60)
HDLC SERPL: 3.1 (ref 0–5)
LDLC SERPL CALC-MCNC: 79 MG/DL (ref 0–100)
POTASSIUM SERPL-SCNC: 4.4 MMOL/L (ref 3.5–5.5)
PROT SERPL-MCNC: 6.6 G/DL (ref 6.4–8.2)
PSA SERPL-MCNC: 0.2 NG/ML (ref 1.4–4.4)
SODIUM SERPL-SCNC: 141 MMOL/L (ref 136–145)
TRIGL SERPL-MCNC: 87 MG/DL (ref 0–150)
VLDLC SERPL CALC-MCNC: 17 MG/DL

## 2025-06-06 PROCEDURE — 80061 LIPID PANEL: CPT

## 2025-06-06 PROCEDURE — 83036 HEMOGLOBIN GLYCOSYLATED A1C: CPT

## 2025-06-06 PROCEDURE — 36415 COLL VENOUS BLD VENIPUNCTURE: CPT

## 2025-06-06 PROCEDURE — G0103 PSA SCREENING: HCPCS

## 2025-06-06 PROCEDURE — 80053 COMPREHEN METABOLIC PANEL: CPT

## 2025-06-10 ENCOUNTER — OFFICE VISIT (OUTPATIENT)
Facility: CLINIC | Age: 75
End: 2025-06-10
Payer: COMMERCIAL

## 2025-06-10 VITALS
TEMPERATURE: 98 F | BODY MASS INDEX: 24.36 KG/M2 | DIASTOLIC BLOOD PRESSURE: 78 MMHG | RESPIRATION RATE: 16 BRPM | SYSTOLIC BLOOD PRESSURE: 126 MMHG | OXYGEN SATURATION: 100 % | HEIGHT: 71 IN | WEIGHT: 174 LBS | HEART RATE: 79 BPM

## 2025-06-10 DIAGNOSIS — E11.9 TYPE 2 DIABETES MELLITUS WITHOUT COMPLICATION, WITHOUT LONG-TERM CURRENT USE OF INSULIN (HCC): ICD-10-CM

## 2025-06-10 DIAGNOSIS — Z00.00 PHYSICAL EXAM: Primary | ICD-10-CM

## 2025-06-10 DIAGNOSIS — R91.8 PULMONARY NODULES: ICD-10-CM

## 2025-06-10 DIAGNOSIS — I10 PRIMARY HYPERTENSION: ICD-10-CM

## 2025-06-10 DIAGNOSIS — K21.9 GASTROESOPHAGEAL REFLUX DISEASE WITHOUT ESOPHAGITIS: ICD-10-CM

## 2025-06-10 DIAGNOSIS — E78.5 DYSLIPIDEMIA: ICD-10-CM

## 2025-06-10 PROCEDURE — 3078F DIAST BP <80 MM HG: CPT | Performed by: INTERNAL MEDICINE

## 2025-06-10 PROCEDURE — 99397 PER PM REEVAL EST PAT 65+ YR: CPT | Performed by: INTERNAL MEDICINE

## 2025-06-10 PROCEDURE — 3074F SYST BP LT 130 MM HG: CPT | Performed by: INTERNAL MEDICINE

## 2025-06-10 SDOH — ECONOMIC STABILITY: FOOD INSECURITY: WITHIN THE PAST 12 MONTHS, YOU WORRIED THAT YOUR FOOD WOULD RUN OUT BEFORE YOU GOT MONEY TO BUY MORE.: NEVER TRUE

## 2025-06-10 SDOH — ECONOMIC STABILITY: FOOD INSECURITY: WITHIN THE PAST 12 MONTHS, THE FOOD YOU BOUGHT JUST DIDN'T LAST AND YOU DIDN'T HAVE MONEY TO GET MORE.: NEVER TRUE

## 2025-06-10 ASSESSMENT — PATIENT HEALTH QUESTIONNAIRE - PHQ9
SUM OF ALL RESPONSES TO PHQ QUESTIONS 1-9: 0
1. LITTLE INTEREST OR PLEASURE IN DOING THINGS: NOT AT ALL
2. FEELING DOWN, DEPRESSED OR HOPELESS: NOT AT ALL
SUM OF ALL RESPONSES TO PHQ QUESTIONS 1-9: 0

## 2025-06-10 NOTE — PROGRESS NOTES
Reilly Esqueda presents today for   Chief Complaint   Patient presents with    Annual Exam       \"Have you been to the ER, urgent care clinic since your last visit?  Hospitalized since your last visit?\"    YES - When: approximately 3  weeks ago.  Where and Why: [facility], wound on leg.    “Have you seen or consulted any other health care providers outside of [facility] since your last visit?”    NO           
        hba1c 6.1, umar neg,               wbc 4.0, hb 13.7, plt 209  From 5/24 showed   gluc 125, cr 0.81, gfr>60, alt 38, hba1c 6.2,    chol 126, tg 84,   hdl 43, ldl-c 66,         ua neg,   psa 0.3  From 12/24 showed gluc 111, cr 0.84, gfr>60,     hba1c 6.4, umar neg,               wbc 5.4, hb 14.1, plt 255    Results for orders placed or performed during the hospital encounter of 06/06/25 (from the past 2160 hours)   Comprehensive Metabolic Panel   Result Value Ref Range    Sodium 141 136 - 145 mmol/L    Potassium 4.4 3.5 - 5.5 mmol/L    Chloride 103 98 - 107 mmol/L    CO2 25 21 - 32 mmol/L    Anion Gap 14 3.0 - 18.0 mmol/L    Glucose 119 (H) 74 - 108 mg/dL    BUN 14 6 - 23 MG/DL    Creatinine 0.75 0.6 - 1.3 MG/DL    BUN/Creatinine Ratio 19 12 - 20      Est, Glom Filt Rate >90 >60 ml/min/1.73m2    Calcium 10.1 8.5 - 10.1 MG/DL    Total Bilirubin 0.5 0.2 - 1.0 MG/DL    ALT 33 10 - 50 U/L    AST 31 10 - 38 U/L    Alk Phosphatase 62 45 - 117 U/L    Total Protein 6.6 6.4 - 8.2 g/dL    Albumin 3.6 3.4 - 5.0 g/dL    Globulin 3.0 2.0 - 4.0 g/dL    Albumin/Globulin Ratio 1.2 0.8 - 1.7     HEMOGLOBIN A1C W/O EAG   Result Value Ref Range    Hemoglobin A1C 6.3 (H) 4.2 - 5.6 %   Lipid Panel   Result Value Ref Range    Cholesterol, Total 142 MG/DL    Triglycerides 87 0 - 150 MG/DL    HDL 46 40 - 60 MG/DL    LDL Cholesterol 79 0 - 100 MG/DL    VLDL Cholesterol Calculated 17 MG/DL    Chol/HDL Ratio 3.1 0 - 5.0     PSA Screening   Result Value Ref Range    PSA 0.2 (L) 1.4 - 4.4 ng/mL   Results for orders placed or performed during the hospital encounter of 05/23/25 (from the past 2160 hours)   CT CHEST WO CONTRAST    Impression    1.  Stable pulmonary nodules as above. No specific follow-up necessary per  Fleischner criteria. However, if patient still qualifies for lung cancer  screening, recommend return to routine screening.  2.  Small amount of subtle groundglass density superior right upper lobe,  nonspecific, may be

## 2025-08-11 ENCOUNTER — TELEPHONE (OUTPATIENT)
Facility: CLINIC | Age: 75
End: 2025-08-11